# Patient Record
Sex: MALE | Race: WHITE | Employment: FULL TIME | URBAN - METROPOLITAN AREA
[De-identification: names, ages, dates, MRNs, and addresses within clinical notes are randomized per-mention and may not be internally consistent; named-entity substitution may affect disease eponyms.]

---

## 2019-08-27 ENCOUNTER — HOSPITAL ENCOUNTER (EMERGENCY)
Age: 44
Discharge: HOME | End: 2019-08-27
Attending: EMERGENCY MEDICINE
Payer: COMMERCIAL

## 2019-08-27 ENCOUNTER — APPOINTMENT (OUTPATIENT)
Dept: CT IMAGING | Age: 44
End: 2019-08-27
Payer: COMMERCIAL

## 2019-08-27 VITALS
RESPIRATION RATE: 16 BRPM | DIASTOLIC BLOOD PRESSURE: 73 MMHG | OXYGEN SATURATION: 98 % | SYSTOLIC BLOOD PRESSURE: 146 MMHG | HEIGHT: 72 IN | BODY MASS INDEX: 35.21 KG/M2 | TEMPERATURE: 97.8 F | WEIGHT: 260 LBS | HEART RATE: 68 BPM

## 2019-08-27 LAB
BILIRUBIN URINE: NEGATIVE
BLOOD, URINE: NEGATIVE
CLARITY: CLEAR
COLOR: YELLOW
GLUCOSE URINE: NEGATIVE MG/DL
KETONES, URINE: NEGATIVE MG/DL
LEUKOCYTE ESTERASE, URINE: NEGATIVE
NITRITE, URINE: NEGATIVE
PH UA: 7 (ref 5–9)
PROTEIN UA: NEGATIVE MG/DL
SPECIFIC GRAVITY UA: <=1.005 (ref 1–1.03)
UROBILINOGEN, URINE: 0.2 E.U./DL

## 2019-08-27 PROCEDURE — 81003 URINALYSIS AUTO W/O SCOPE: CPT

## 2019-08-27 PROCEDURE — 6360000002 HC RX W HCPCS: Performed by: EMERGENCY MEDICINE

## 2019-08-27 PROCEDURE — 72131 CT LUMBAR SPINE W/O DYE: CPT

## 2019-08-27 PROCEDURE — 96372 THER/PROPH/DIAG INJ SC/IM: CPT

## 2019-08-27 PROCEDURE — 99284 EMERGENCY DEPT VISIT MOD MDM: CPT

## 2019-08-27 RX ORDER — ORPHENADRINE CITRATE 30 MG/ML
60 INJECTION INTRAMUSCULAR; INTRAVENOUS ONCE
Status: COMPLETED | OUTPATIENT
Start: 2019-08-27 | End: 2019-08-27

## 2019-08-27 RX ORDER — CHLORZOXAZONE 500 MG/1
500 TABLET ORAL 3 TIMES DAILY PRN
Qty: 30 TABLET | Refills: 0 | Status: SHIPPED | OUTPATIENT
Start: 2019-08-27 | End: 2019-09-06

## 2019-08-27 RX ORDER — OXYCODONE HYDROCHLORIDE AND ACETAMINOPHEN 5; 325 MG/1; MG/1
1 TABLET ORAL EVERY 6 HOURS PRN
Qty: 10 TABLET | Refills: 0 | Status: SHIPPED | OUTPATIENT
Start: 2019-08-27 | End: 2019-08-30

## 2019-08-27 RX ORDER — KETOROLAC TROMETHAMINE 30 MG/ML
30 INJECTION, SOLUTION INTRAMUSCULAR; INTRAVENOUS ONCE
Status: COMPLETED | OUTPATIENT
Start: 2019-08-27 | End: 2019-08-27

## 2019-08-27 RX ORDER — PREDNISONE 10 MG/1
40 TABLET ORAL DAILY
Qty: 20 TABLET | Refills: 0 | Status: SHIPPED | OUTPATIENT
Start: 2019-08-27 | End: 2019-09-01

## 2019-08-27 RX ADMIN — KETOROLAC TROMETHAMINE 30 MG: 30 INJECTION, SOLUTION INTRAMUSCULAR at 10:44

## 2019-08-27 RX ADMIN — ORPHENADRINE CITRATE 60 MG: 30 INJECTION INTRAMUSCULAR; INTRAVENOUS at 10:44

## 2019-08-27 ASSESSMENT — PAIN DESCRIPTION - PAIN TYPE: TYPE: ACUTE PAIN

## 2019-08-27 ASSESSMENT — PAIN DESCRIPTION - ORIENTATION: ORIENTATION: LOWER

## 2019-08-27 ASSESSMENT — PAIN SCALES - GENERAL
PAINLEVEL_OUTOF10: 5
PAINLEVEL_OUTOF10: 3

## 2019-08-27 ASSESSMENT — PAIN DESCRIPTION - LOCATION: LOCATION: SCROTUM

## 2019-08-27 ASSESSMENT — PAIN DESCRIPTION - DESCRIPTORS: DESCRIPTORS: DISCOMFORT

## 2019-08-27 NOTE — ED PROVIDER NOTES
HPI:  8/27/19,   Time: 11:12 AM         Tyrese Pollack is a 45 y.o. male presenting to the ED for back pain, beginning more than 1 day ago. The complaint has been persistent, moderate in severity, and worsened by nothing. Patient complaining of back pain no history of fall or direct trauma. ROS:   Pertinent positives and negatives are stated within HPI, all other systems reviewed and are negative.  --------------------------------------------- PAST HISTORY ---------------------------------------------  Past Medical History:  has no past medical history on file. Past Surgical History:  has a past surgical history that includes Knee arthroscopy. Social History:  reports that he quit smoking about 5 years ago. His smoking use included cigarettes. He smoked 1.00 pack per day. His smokeless tobacco use includes chew. He reports that he drinks alcohol. He reports that he does not use drugs. Family History: family history is not on file. The patients home medications have been reviewed. Allergies: Patient has no known allergies. -------------------------------------------------- RESULTS -------------------------------------------------  All laboratory and radiology results have been personally reviewed by myself   LABS:  Results for orders placed or performed during the hospital encounter of 08/27/19   Urinalysis   Result Value Ref Range    Color, UA Yellow Straw/Yellow    Clarity, UA Clear Clear    Glucose, Ur Negative Negative mg/dL    Bilirubin Urine Negative Negative    Ketones, Urine Negative Negative mg/dL    Specific Gravity, UA <=1.005 1.005 - 1.030    Blood, Urine Negative Negative    pH, UA 7.0 5.0 - 9.0    Protein, UA Negative Negative mg/dL    Urobilinogen, Urine 0.2 <2.0 E.U./dL    Nitrite, Urine Negative Negative    Leukocyte Esterase, Urine Negative Negative       RADIOLOGY:  Interpreted by Radiologist.  CT Lumbar Spine WO Contrast   Final Result      1.  Degenerative spondylotic

## 2020-12-02 ENCOUNTER — APPOINTMENT (OUTPATIENT)
Dept: GENERAL RADIOLOGY | Age: 45
End: 2020-12-02
Payer: COMMERCIAL

## 2020-12-02 ENCOUNTER — HOSPITAL ENCOUNTER (EMERGENCY)
Age: 45
Discharge: HOME | End: 2020-12-02
Attending: EMERGENCY MEDICINE
Payer: COMMERCIAL

## 2020-12-02 VITALS
TEMPERATURE: 96.9 F | RESPIRATION RATE: 16 BRPM | BODY MASS INDEX: 35.89 KG/M2 | HEIGHT: 72 IN | DIASTOLIC BLOOD PRESSURE: 84 MMHG | OXYGEN SATURATION: 99 % | WEIGHT: 265 LBS | SYSTOLIC BLOOD PRESSURE: 157 MMHG | HEART RATE: 108 BPM

## 2020-12-02 PROCEDURE — 99283 EMERGENCY DEPT VISIT LOW MDM: CPT

## 2020-12-02 PROCEDURE — 73562 X-RAY EXAM OF KNEE 3: CPT

## 2020-12-02 PROCEDURE — 12002 RPR S/N/AX/GEN/TRNK2.6-7.5CM: CPT

## 2020-12-02 PROCEDURE — 73610 X-RAY EXAM OF ANKLE: CPT

## 2020-12-02 PROCEDURE — 73590 X-RAY EXAM OF LOWER LEG: CPT

## 2020-12-02 PROCEDURE — 73630 X-RAY EXAM OF FOOT: CPT

## 2020-12-02 NOTE — ED PROVIDER NOTES
HPI:  12/2/20, Time: 12:27 PM DEUCE Felipe is a 39 y.o. male presenting to the ED for right shin laceration, beginning 1 hour ago. The complaint has been persistent, mild in severity, and worsened by nothing. Patient states that he was at work and he dropped an air compressor on his leg. States that he obtained a laceration to his right anterior leg and also states that his right knee hurts as well. States that he said multiple surgeries on the right knee and that is actually more painful than the laceration on the right shin. Denies any other injury. Denies hitting his head or losing consciousness. Denies fever, fatigue, cough, chest pain, shortness of breath, nausea, vomiting, abdominal pain, diarrhea, constipation, urinary symptoms. Patient does note that his tetanus shot is up-to-date and that he had one last year. Review of Systems:   Please see HPI above. All bolded are positive. All un-bolded are negative.     Constitutional Symptoms: fever, chills, fatigue, generalized weakness, diaphoresis, increase in thirst, loss of appetite  Eyes: vision change   Ears, Nose, Mouth, Throat: hearing loss, nasal congestion, sores in the mouth  Cardiovascular: chest pain, chest heaviness, palpitations  Respiratory: shortness of breath, wheezing, coughing  Gastrointestinal: abdominal pain, nausea, vomiting, diarrhea, constipation, melena, hematochezia, hematemesis  Genitourinary: dysuria, hematuria, increased frequency  Musculoskeletal: lower extremity edema, myalgias, arthralgias, back pain, right knee pain, right ankle pain, right foot pain, right shin pain  Integumentary: Laceration, rashes, itching   Neurological: headache, lightheadedness, dizziness, confusion, syncope, numbness, tingling, focal weakness  Psychiatric: depression, suicidal ideation, anxiety  Endocrine: unintentional weight change  Hematologic/Lymphatic: lymphadenopathy, easy bruising, easy bleeding   Allergic/Immunologic: recurrent infections            --------------------------------------------- PAST HISTORY ---------------------------------------------  Past Medical History:  has no past medical history on file. Past Surgical History:  has a past surgical history that includes Knee arthroscopy. Social History:  reports that he quit smoking about 6 years ago. His smoking use included cigarettes. He smoked 1.00 pack per day. His smokeless tobacco use includes chew. He reports current alcohol use. He reports that he does not use drugs. Family History: family history is not on file. The patients home medications have been reviewed. Allergies: Patient has no known allergies. -------------------------------------------------- RESULTS -------------------------------------------------  All laboratory and radiology results have been personally reviewed by myself   LABS:  No results found for this visit on 12/02/20. RADIOLOGY:  Interpreted by Radiologist.  XR FOOT RIGHT (MIN 3 VIEWS)   Final Result   No evidence for acute fracture. XR ANKLE RIGHT (MIN 3 VIEWS)   Final Result   No evidence for acute fracture. XR KNEE RIGHT (3 VIEWS)   Final Result   No acute fracture or dislocation in right knee and right tibia/fibula      XR TIBIA FIBULA RIGHT (2 VIEWS)   Final Result   No acute fracture or dislocation in right knee and right tibia/fibula          ------------------------- NURSING NOTES AND VITALS REVIEWED ---------------------------   The nursing notes within the ED encounter and vital signs as below have been reviewed.    BP (!) 157/84   Pulse 108   Temp 96.9 °F (36.1 °C) (Tympanic)   Resp 16   Ht 6' (1.829 m)   Wt 265 lb (120.2 kg)   SpO2 99%   BMI 35.94 kg/m²   Oxygen Saturation Interpretation: Normal      ---------------------------------------------------PHYSICAL EXAM--------------------------------------      Constitutional/General: Alert and oriented x3, well appearing, non toxic in NAD  Head: does have a sick centimeter laceration to his right shin. This was repaired using 3-0 Ethilon suture. Patient be discharged home instructions to follow-up with his orthopedic physician, Dr. Felipe Client. He is agreeable to discharge this time. All questions were answered. Counseling: The emergency provider has spoken with the patient and discussed todays results, in addition to providing specific details for the plan of care and counseling regarding the diagnosis and prognosis. Questions are answered at this time and they are agreeable with the plan.      --------------------------------- IMPRESSION AND DISPOSITION ---------------------------------    IMPRESSION  1. Laceration of right lower extremity, initial encounter    2. Contusion of right knee and lower leg, initial encounter        DISPOSITION  Disposition: Discharge to home  Patient condition is stable      NOTE: This report was transcribed using voice recognition software.  Every effort was made to ensure accuracy; however, inadvertent computerized transcription errors may be present       Harley Smtih DO  Resident  12/02/20 4855

## 2021-09-27 ENCOUNTER — TELEPHONE (OUTPATIENT)
Dept: PRIMARY CARE CLINIC | Age: 46
End: 2021-09-27

## 2021-09-27 NOTE — TELEPHONE ENCOUNTER
----- Message from Emelia Goodell sent at 9/27/2021  9:34 AM EDT -----  Subject: Appointment Request    Reason for Call: Routine Pre-Op    QUESTIONS  Type of Appointment? New Patient/New to Provider  Reason for appointment request? No appointments available during search  Additional Information for Provider? Patient is scheduled for surgery on   10/14/21. He is establishing care with Dr Shreya Delcid on 12/15/21. He needs a   pre-op prior to his surgery, R shoulder, Orthopedic surgery center with Dr Kraig Resendiz. Please call patient to advise if he can be seen for this pre-op.   ---------------------------------------------------------------------------  --------------  CALL BACK INFO  What is the best way for the office to contact you? OK to leave message on   voicemail  Preferred Call Back Phone Number? [phone]  ---------------------------------------------------------------------------  --------------  SCRIPT ANSWERS  Relationship to Patient? Self  Specialty Confirmation? Primary Care  Do you have questions for your provider that need to be answered prior to   scheduling your pre-op appointment? No  Have you been diagnosed with, awaiting test results for, or told that you   are suspected of having COVID-19 (Coronavirus)? (If patient has tested   negative or was tested as a requirement for work, school, or travel and   not based on symptoms, answer no)? No  Within the past two weeks have you developed any of the following symptoms   (answer no if symptoms have been present longer than 2 weeks or began   more than 2 weeks ago)? Fever or Chills, Cough, Shortness of breath or   difficulty breathing, Loss of taste or smell, Sore throat, Nasal   congestion, Sneezing or runny nose, Fatigue or generalized body aches   (answer no if pain is specific to a body part e.g. back pain), Diarrhea,   Headache? No  Have you had close contact with someone with COVID-19 in the last 14 days?    No  (Service Expert  click yes below to proceed with [employer] As Usual   Scheduling)?  Yes

## 2021-10-15 ENCOUNTER — HOSPITAL ENCOUNTER (EMERGENCY)
Age: 46
Discharge: HOME | End: 2021-10-15
Payer: COMMERCIAL

## 2021-10-15 VITALS
HEIGHT: 72 IN | DIASTOLIC BLOOD PRESSURE: 75 MMHG | SYSTOLIC BLOOD PRESSURE: 160 MMHG | BODY MASS INDEX: 37.25 KG/M2 | TEMPERATURE: 97.6 F | OXYGEN SATURATION: 97 % | WEIGHT: 275 LBS | RESPIRATION RATE: 16 BRPM | HEART RATE: 59 BPM

## 2021-10-15 DIAGNOSIS — G89.18 POST-OP PAIN: Primary | ICD-10-CM

## 2021-10-15 PROCEDURE — 99283 EMERGENCY DEPT VISIT LOW MDM: CPT

## 2021-10-15 PROCEDURE — 6370000000 HC RX 637 (ALT 250 FOR IP): Performed by: NURSE PRACTITIONER

## 2021-10-15 RX ORDER — OXYCODONE HYDROCHLORIDE 5 MG/1
10 TABLET ORAL ONCE
Status: COMPLETED | OUTPATIENT
Start: 2021-10-15 | End: 2021-10-15

## 2021-10-15 RX ADMIN — OXYCODONE 10 MG: 5 TABLET ORAL at 21:41

## 2021-10-15 ASSESSMENT — PAIN DESCRIPTION - PAIN TYPE: TYPE: ACUTE PAIN

## 2021-10-15 ASSESSMENT — PAIN DESCRIPTION - LOCATION: LOCATION: SHOULDER

## 2021-10-15 ASSESSMENT — PAIN DESCRIPTION - ORIENTATION: ORIENTATION: RIGHT

## 2021-10-15 ASSESSMENT — PAIN DESCRIPTION - FREQUENCY: FREQUENCY: CONTINUOUS

## 2021-10-15 ASSESSMENT — PAIN DESCRIPTION - DESCRIPTORS: DESCRIPTORS: ACHING

## 2021-10-15 ASSESSMENT — PAIN SCALES - GENERAL: PAINLEVEL_OUTOF10: 9

## 2021-10-16 NOTE — ED PROVIDER NOTES
[facility]  Department of Emergency Medicine   ED  Encounter Note  Admit Date/RoomTime: No admission date for patient encounter. ED Room: Room/bed info not found    NAME: Dai Wall  : 1975  MRN: [id number]     Chief Complaint:  Post-op Problem (surgery on right rotator cuff and is in a lot of pain. surgery was yesterday and nerve block wore off this morning with increasing pain, took 6 vicodin with no change )    History of Present Illness       Dai Wall is a 42 y.o. old male who presents to the emergency department by private vehicle, for post-operative Right shoulder pain which occured 1 day(s) prior to arrival.  He states he had rotator cuff repair surgery yesterday by his orthopedic surgeon. He has laparoscopic incisions symptoms right shoulder and he is wearing a sling. He is right handed. The patients tetanus status is up to date. Since onset the symptoms have been persistent. His pain is aggraveated by any movement and relieved by nothing. He denies any head injury, headache, loss of consciousness, confusion, dizziness, neck pain, chest pain, abdominal pain, back pain, extremity injury, numbness, weakness, blurred vision, nausea, vomiting or fever. He states he is taking hydrocodone without improvement of his symptoms. ROS   Pertinent positives and negatives are stated within HPI, all other systems reviewed and are negative. Past Medical History:  has no past medical history on file. Surgical History:  has a past surgical history that includes Knee arthroscopy. Social History:  reports that he quit smoking about 7 years ago. His smoking use included cigarettes. He smoked 1.00 pack per day. His smokeless tobacco use includes chew. He reports current alcohol use. He reports that he does not use drugs. Family History: family history is not on file. Allergies: Patient has no known allergies.     Physical Exam   Oxygen Saturation Interpretation: Normal.        ED Triage Vitals [10/15/21 2029]   BP Temp Temp Source Pulse Resp SpO2 Height Weight   (!) 160/75 97.6 °F (36.4 °C) Oral 59 16 97 % 6' (1.829 m) 275 lb (124.7 kg)         Constitutional:  Alert, development consistent with age. Neck:  Normal ROM. Supple. Non-tender. Right Shoulder: lateral.              Tenderness: mild              Swelling: Mild. Deformity: no deformity observed/palpated. ROM: Limited range of motion due to postop status. Skin: Multiple laparoscopic incision sites with no erythema, edema, bleeding, or drainage. Neurovascular: Motor deficit: none. Sensory deficit: none. Pulse deficit: none. Capillary refill: normal.    Right Elbow: diffusely across elbow            Tenderness:  none. Swelling: None. Deformity: no deformity observed/palpated. ROM: full painless ROM. Skin:  no wounds, erythema, or swelling. Lymphatics: No lymphangitis or edema noted  Neurological:  Oriented. Motor functions intact. Lab / Imaging Results   (All laboratory and radiology results have been personally reviewed by myself)  Labs:  No results found for this visit on 10/15/21. Imaging: All Radiology results interpreted by Radiologist unless otherwise noted. No orders to display     ED Course / Medical Decision Making     Medications   oxyCODONE (ROXICODONE) immediate release tablet 10 mg (has no administration in time range)         MDM:    Patient presented with right lateral shoulder postop pain after having rotator cuff repair yesterday. He states he is taking hydrocodone without improvement. Clinical exam is consistent with expected postop exam.  There are no signs of infection or trauma. He was medicated with 10 mg of oral Roxicodone and he is instructed to continue his home pain regimen as directed.   He is appropriate for discharge and outpatient follow-up. He is instructed to return to the emergency department immediately with any new or worsening symptoms. Plan of Care/Counseling:  LASHON Tirado CNP reviewed today's visit with the patient and spouse / life partner in addition to providing specific details for the plan of care and counseling regarding the diagnosis and prognosis. Questions are answered at this time and are agreeable with the plan. Assessment      1. Post-op pain      Plan   Discharged home. Patient condition is good    New Medications     New Prescriptions    No medications on file     Electronically signed by LASHON Tirado CNP   DD: 10/15/21  **This report was transcribed using voice recognition software. Every effort was made to ensure accuracy; however, inadvertent computerized transcription errors may be present.   END OF ED PROVIDER NOTE          LASHON Roberts CNP  10/15/21 7088

## 2022-09-25 ENCOUNTER — APPOINTMENT (OUTPATIENT)
Dept: GENERAL RADIOLOGY | Age: 47
DRG: 580 | End: 2022-09-25
Payer: COMMERCIAL

## 2022-09-25 ENCOUNTER — HOSPITAL ENCOUNTER (INPATIENT)
Age: 47
LOS: 8 days | Discharge: HOME HEALTH | DRG: 580 | End: 2022-10-03
Attending: EMERGENCY MEDICINE | Admitting: INTERNAL MEDICINE
Payer: COMMERCIAL

## 2022-09-25 DIAGNOSIS — L03.818 CELLULITIS OF OTHER SPECIFIED SITE: Primary | ICD-10-CM

## 2022-09-25 DIAGNOSIS — S69.92XA INJURY OF LEFT HAND, INITIAL ENCOUNTER: ICD-10-CM

## 2022-09-25 DIAGNOSIS — L02.91 ABSCESS: ICD-10-CM

## 2022-09-25 PROBLEM — E66.9 OBESITY (BMI 30-39.9): Status: ACTIVE | Noted: 2022-09-25

## 2022-09-25 PROBLEM — R73.9 HYPERGLYCEMIA: Status: ACTIVE | Noted: 2022-09-25

## 2022-09-25 PROBLEM — L03.90 CELLULITIS: Status: ACTIVE | Noted: 2022-09-25

## 2022-09-25 PROBLEM — E78.5 HYPERLIPIDEMIA: Status: ACTIVE | Noted: 2022-09-25

## 2022-09-25 LAB
ANION GAP SERPL CALCULATED.3IONS-SCNC: 10 MMOL/L (ref 7–16)
BASOPHILS ABSOLUTE: 0.03 E9/L (ref 0–0.2)
BASOPHILS RELATIVE PERCENT: 0.3 % (ref 0–2)
BUN BLDV-MCNC: 16 MG/DL (ref 6–20)
CALCIUM SERPL-MCNC: 9.3 MG/DL (ref 8.6–10.2)
CHLORIDE BLD-SCNC: 104 MMOL/L (ref 98–107)
CO2: 22 MMOL/L (ref 22–29)
CREAT SERPL-MCNC: 0.9 MG/DL (ref 0.7–1.2)
EOSINOPHILS ABSOLUTE: 0.08 E9/L (ref 0.05–0.5)
EOSINOPHILS RELATIVE PERCENT: 0.8 % (ref 0–6)
GFR AFRICAN AMERICAN: >60
GFR NON-AFRICAN AMERICAN: >60 ML/MIN/1.73
GLUCOSE BLD-MCNC: 111 MG/DL (ref 74–99)
HCT VFR BLD CALC: 43.6 % (ref 37–54)
HEMOGLOBIN: 14.9 G/DL (ref 12.5–16.5)
IMMATURE GRANULOCYTES #: 0.04 E9/L
IMMATURE GRANULOCYTES %: 0.4 % (ref 0–5)
LACTIC ACID: 1.2 MMOL/L (ref 0.5–2.2)
LYMPHOCYTES ABSOLUTE: 1.37 E9/L (ref 1.5–4)
LYMPHOCYTES RELATIVE PERCENT: 13.2 % (ref 20–42)
MCH RBC QN AUTO: 31 PG (ref 26–35)
MCHC RBC AUTO-ENTMCNC: 34.2 % (ref 32–34.5)
MCV RBC AUTO: 90.6 FL (ref 80–99.9)
MONOCYTES ABSOLUTE: 0.54 E9/L (ref 0.1–0.95)
MONOCYTES RELATIVE PERCENT: 5.2 % (ref 2–12)
NEUTROPHILS ABSOLUTE: 8.29 E9/L (ref 1.8–7.3)
NEUTROPHILS RELATIVE PERCENT: 80.1 % (ref 43–80)
PDW BLD-RTO: 13.2 FL (ref 11.5–15)
PLATELET # BLD: 186 E9/L (ref 130–450)
PMV BLD AUTO: 10.9 FL (ref 7–12)
POTASSIUM SERPL-SCNC: 4 MMOL/L (ref 3.5–5)
RBC # BLD: 4.81 E12/L (ref 3.8–5.8)
SODIUM BLD-SCNC: 136 MMOL/L (ref 132–146)
WBC # BLD: 10.4 E9/L (ref 4.5–11.5)

## 2022-09-25 PROCEDURE — 83605 ASSAY OF LACTIC ACID: CPT

## 2022-09-25 PROCEDURE — 1200000000 HC SEMI PRIVATE

## 2022-09-25 PROCEDURE — 80048 BASIC METABOLIC PNL TOTAL CA: CPT

## 2022-09-25 PROCEDURE — 2580000003 HC RX 258: Performed by: STUDENT IN AN ORGANIZED HEALTH CARE EDUCATION/TRAINING PROGRAM

## 2022-09-25 PROCEDURE — 99285 EMERGENCY DEPT VISIT HI MDM: CPT

## 2022-09-25 PROCEDURE — 6360000002 HC RX W HCPCS

## 2022-09-25 PROCEDURE — 73130 X-RAY EXAM OF HAND: CPT

## 2022-09-25 PROCEDURE — 6360000002 HC RX W HCPCS: Performed by: STUDENT IN AN ORGANIZED HEALTH CARE EDUCATION/TRAINING PROGRAM

## 2022-09-25 PROCEDURE — 36415 COLL VENOUS BLD VENIPUNCTURE: CPT

## 2022-09-25 PROCEDURE — 85025 COMPLETE CBC W/AUTO DIFF WBC: CPT

## 2022-09-25 PROCEDURE — 2580000003 HC RX 258

## 2022-09-25 PROCEDURE — APPSS45 APP SPLIT SHARED TIME 31-45 MINUTES

## 2022-09-25 PROCEDURE — 87040 BLOOD CULTURE FOR BACTERIA: CPT

## 2022-09-25 RX ORDER — ONDANSETRON 2 MG/ML
4 INJECTION INTRAMUSCULAR; INTRAVENOUS EVERY 6 HOURS PRN
Status: DISCONTINUED | OUTPATIENT
Start: 2022-09-25 | End: 2022-10-03 | Stop reason: HOSPADM

## 2022-09-25 RX ORDER — SODIUM CHLORIDE 0.9 % (FLUSH) 0.9 %
5-40 SYRINGE (ML) INJECTION EVERY 12 HOURS SCHEDULED
Status: DISCONTINUED | OUTPATIENT
Start: 2022-09-25 | End: 2022-09-28 | Stop reason: SDUPTHER

## 2022-09-25 RX ORDER — FLUOXETINE HYDROCHLORIDE 20 MG/1
20 CAPSULE ORAL DAILY
COMMUNITY

## 2022-09-25 RX ORDER — 0.9 % SODIUM CHLORIDE 0.9 %
1000 INTRAVENOUS SOLUTION INTRAVENOUS ONCE
Status: COMPLETED | OUTPATIENT
Start: 2022-09-25 | End: 2022-09-25

## 2022-09-25 RX ORDER — ACETAMINOPHEN 325 MG/1
650 TABLET ORAL EVERY 6 HOURS PRN
Status: DISCONTINUED | OUTPATIENT
Start: 2022-09-25 | End: 2022-10-03 | Stop reason: HOSPADM

## 2022-09-25 RX ORDER — SODIUM CHLORIDE 9 MG/ML
INJECTION, SOLUTION INTRAVENOUS PRN
Status: DISCONTINUED | OUTPATIENT
Start: 2022-09-25 | End: 2022-09-28 | Stop reason: SDUPTHER

## 2022-09-25 RX ORDER — FLUOXETINE HYDROCHLORIDE 20 MG/1
20 CAPSULE ORAL DAILY
Status: DISCONTINUED | OUTPATIENT
Start: 2022-09-26 | End: 2022-10-03 | Stop reason: HOSPADM

## 2022-09-25 RX ORDER — SODIUM CHLORIDE 0.9 % (FLUSH) 0.9 %
5-40 SYRINGE (ML) INJECTION PRN
Status: DISCONTINUED | OUTPATIENT
Start: 2022-09-25 | End: 2022-09-28 | Stop reason: SDUPTHER

## 2022-09-25 RX ORDER — POLYETHYLENE GLYCOL 3350 17 G/17G
17 POWDER, FOR SOLUTION ORAL DAILY PRN
Status: DISCONTINUED | OUTPATIENT
Start: 2022-09-25 | End: 2022-10-03 | Stop reason: HOSPADM

## 2022-09-25 RX ORDER — ATORVASTATIN CALCIUM 20 MG/1
20 TABLET, FILM COATED ORAL NIGHTLY
Status: DISCONTINUED | OUTPATIENT
Start: 2022-09-25 | End: 2022-09-26 | Stop reason: SDUPTHER

## 2022-09-25 RX ORDER — ACETAMINOPHEN 650 MG/1
650 SUPPOSITORY RECTAL EVERY 6 HOURS PRN
Status: DISCONTINUED | OUTPATIENT
Start: 2022-09-25 | End: 2022-10-03 | Stop reason: HOSPADM

## 2022-09-25 RX ORDER — ATORVASTATIN CALCIUM 20 MG/1
20 TABLET, FILM COATED ORAL DAILY
COMMUNITY

## 2022-09-25 RX ORDER — KETOROLAC TROMETHAMINE 30 MG/ML
30 INJECTION, SOLUTION INTRAMUSCULAR; INTRAVENOUS EVERY 6 HOURS PRN
Status: DISPENSED | OUTPATIENT
Start: 2022-09-25 | End: 2022-09-30

## 2022-09-25 RX ORDER — MELOXICAM 15 MG/1
15 TABLET ORAL DAILY
Status: ON HOLD | COMMUNITY
End: 2022-10-03 | Stop reason: HOSPADM

## 2022-09-25 RX ORDER — ONDANSETRON 4 MG/1
4 TABLET, ORALLY DISINTEGRATING ORAL EVERY 8 HOURS PRN
Status: DISCONTINUED | OUTPATIENT
Start: 2022-09-25 | End: 2022-10-03 | Stop reason: HOSPADM

## 2022-09-25 RX ADMIN — KETOROLAC TROMETHAMINE 30 MG: 30 INJECTION, SOLUTION INTRAMUSCULAR; INTRAVENOUS at 23:32

## 2022-09-25 RX ADMIN — SODIUM CHLORIDE 1000 ML: 9 INJECTION, SOLUTION INTRAVENOUS at 20:50

## 2022-09-25 RX ADMIN — Medication 10 ML: at 23:24

## 2022-09-25 RX ADMIN — WATER 2000 MG: 1 INJECTION INTRAMUSCULAR; INTRAVENOUS; SUBCUTANEOUS at 22:17

## 2022-09-25 ASSESSMENT — PAIN DESCRIPTION - LOCATION
LOCATION: HAND
LOCATION: HAND

## 2022-09-25 ASSESSMENT — ENCOUNTER SYMPTOMS
COUGH: 0
NAUSEA: 0
VOICE CHANGE: 0
DIARRHEA: 0
RHINORRHEA: 0
ABDOMINAL PAIN: 0
PHOTOPHOBIA: 0
TROUBLE SWALLOWING: 0
VOMITING: 0
SHORTNESS OF BREATH: 0

## 2022-09-25 ASSESSMENT — PAIN DESCRIPTION - DESCRIPTORS: DESCRIPTORS: ACHING;DISCOMFORT

## 2022-09-25 ASSESSMENT — PAIN DESCRIPTION - FREQUENCY: FREQUENCY: CONTINUOUS

## 2022-09-25 ASSESSMENT — PAIN SCALES - GENERAL
PAINLEVEL_OUTOF10: 4
PAINLEVEL_OUTOF10: 4

## 2022-09-25 ASSESSMENT — PAIN - FUNCTIONAL ASSESSMENT: PAIN_FUNCTIONAL_ASSESSMENT: 0-10

## 2022-09-25 ASSESSMENT — PAIN DESCRIPTION - ORIENTATION
ORIENTATION: LEFT
ORIENTATION: LEFT

## 2022-09-25 ASSESSMENT — PAIN DESCRIPTION - PAIN TYPE: TYPE: ACUTE PAIN

## 2022-09-26 LAB
ANION GAP SERPL CALCULATED.3IONS-SCNC: 11 MMOL/L (ref 7–16)
BASOPHILS ABSOLUTE: 0.04 E9/L (ref 0–0.2)
BASOPHILS RELATIVE PERCENT: 0.5 % (ref 0–2)
BUN BLDV-MCNC: 16 MG/DL (ref 6–20)
CALCIUM SERPL-MCNC: 9 MG/DL (ref 8.6–10.2)
CHLORIDE BLD-SCNC: 106 MMOL/L (ref 98–107)
CO2: 22 MMOL/L (ref 22–29)
CREAT SERPL-MCNC: 0.9 MG/DL (ref 0.7–1.2)
EOSINOPHILS ABSOLUTE: 0.16 E9/L (ref 0.05–0.5)
EOSINOPHILS RELATIVE PERCENT: 1.8 % (ref 0–6)
GFR AFRICAN AMERICAN: >60
GFR NON-AFRICAN AMERICAN: >60 ML/MIN/1.73
GLUCOSE BLD-MCNC: 99 MG/DL (ref 74–99)
HBA1C MFR BLD: 5.1 % (ref 4–5.6)
HCT VFR BLD CALC: 41.7 % (ref 37–54)
HEMOGLOBIN: 13.7 G/DL (ref 12.5–16.5)
IMMATURE GRANULOCYTES #: 0.03 E9/L
IMMATURE GRANULOCYTES %: 0.3 % (ref 0–5)
LYMPHOCYTES ABSOLUTE: 1.7 E9/L (ref 1.5–4)
LYMPHOCYTES RELATIVE PERCENT: 19.1 % (ref 20–42)
MCH RBC QN AUTO: 29.9 PG (ref 26–35)
MCHC RBC AUTO-ENTMCNC: 32.9 % (ref 32–34.5)
MCV RBC AUTO: 91 FL (ref 80–99.9)
MONOCYTES ABSOLUTE: 0.65 E9/L (ref 0.1–0.95)
MONOCYTES RELATIVE PERCENT: 7.3 % (ref 2–12)
NEUTROPHILS ABSOLUTE: 6.3 E9/L (ref 1.8–7.3)
NEUTROPHILS RELATIVE PERCENT: 71 % (ref 43–80)
PDW BLD-RTO: 13.2 FL (ref 11.5–15)
PLATELET # BLD: 155 E9/L (ref 130–450)
PMV BLD AUTO: 11.6 FL (ref 7–12)
POTASSIUM REFLEX MAGNESIUM: 3.8 MMOL/L (ref 3.5–5)
RBC # BLD: 4.58 E12/L (ref 3.8–5.8)
SODIUM BLD-SCNC: 139 MMOL/L (ref 132–146)
WBC # BLD: 8.9 E9/L (ref 4.5–11.5)

## 2022-09-26 PROCEDURE — 2580000003 HC RX 258

## 2022-09-26 PROCEDURE — 1200000000 HC SEMI PRIVATE

## 2022-09-26 PROCEDURE — 6360000002 HC RX W HCPCS

## 2022-09-26 PROCEDURE — 36415 COLL VENOUS BLD VENIPUNCTURE: CPT

## 2022-09-26 PROCEDURE — 85025 COMPLETE CBC W/AUTO DIFF WBC: CPT

## 2022-09-26 PROCEDURE — 83036 HEMOGLOBIN GLYCOSYLATED A1C: CPT

## 2022-09-26 PROCEDURE — 6370000000 HC RX 637 (ALT 250 FOR IP)

## 2022-09-26 PROCEDURE — 99232 SBSQ HOSP IP/OBS MODERATE 35: CPT | Performed by: INTERNAL MEDICINE

## 2022-09-26 PROCEDURE — 80048 BASIC METABOLIC PNL TOTAL CA: CPT

## 2022-09-26 PROCEDURE — 87070 CULTURE OTHR SPECIMN AEROBIC: CPT

## 2022-09-26 RX ORDER — ATORVASTATIN CALCIUM 20 MG/1
20 TABLET, FILM COATED ORAL DAILY
Status: DISCONTINUED | OUTPATIENT
Start: 2022-09-26 | End: 2022-10-03 | Stop reason: HOSPADM

## 2022-09-26 RX ADMIN — Medication 10 ML: at 20:48

## 2022-09-26 RX ADMIN — KETOROLAC TROMETHAMINE 30 MG: 30 INJECTION, SOLUTION INTRAMUSCULAR; INTRAVENOUS at 05:26

## 2022-09-26 RX ADMIN — ACETAMINOPHEN 650 MG: 325 TABLET ORAL at 18:09

## 2022-09-26 RX ADMIN — KETOROLAC TROMETHAMINE 30 MG: 30 INJECTION, SOLUTION INTRAMUSCULAR; INTRAVENOUS at 14:02

## 2022-09-26 RX ADMIN — WATER 1000 MG: 1 INJECTION INTRAMUSCULAR; INTRAVENOUS; SUBCUTANEOUS at 05:26

## 2022-09-26 RX ADMIN — WATER 1000 MG: 1 INJECTION INTRAMUSCULAR; INTRAVENOUS; SUBCUTANEOUS at 14:03

## 2022-09-26 RX ADMIN — WATER 1000 MG: 1 INJECTION INTRAMUSCULAR; INTRAVENOUS; SUBCUTANEOUS at 22:30

## 2022-09-26 RX ADMIN — KETOROLAC TROMETHAMINE 30 MG: 30 INJECTION, SOLUTION INTRAMUSCULAR; INTRAVENOUS at 20:48

## 2022-09-26 RX ADMIN — Medication 10 ML: at 09:20

## 2022-09-26 ASSESSMENT — PAIN - FUNCTIONAL ASSESSMENT: PAIN_FUNCTIONAL_ASSESSMENT: ACTIVITIES ARE NOT PREVENTED

## 2022-09-26 ASSESSMENT — PAIN DESCRIPTION - PAIN TYPE: TYPE: ACUTE PAIN

## 2022-09-26 ASSESSMENT — PAIN SCALES - GENERAL
PAINLEVEL_OUTOF10: 4
PAINLEVEL_OUTOF10: 3
PAINLEVEL_OUTOF10: 2
PAINLEVEL_OUTOF10: 2

## 2022-09-26 ASSESSMENT — PAIN DESCRIPTION - DESCRIPTORS
DESCRIPTORS: ACHING
DESCRIPTORS: DISCOMFORT
DESCRIPTORS: THROBBING

## 2022-09-26 ASSESSMENT — PAIN DESCRIPTION - LOCATION
LOCATION: HEAD
LOCATION: HAND
LOCATION: HAND

## 2022-09-26 ASSESSMENT — PAIN DESCRIPTION - ORIENTATION
ORIENTATION: LEFT
ORIENTATION: LEFT

## 2022-09-26 ASSESSMENT — PAIN DESCRIPTION - FREQUENCY: FREQUENCY: CONTINUOUS

## 2022-09-26 NOTE — PROGRESS NOTES
Dr Al Avila called back deferring to on call ortho [Initial Visit] : an initial visit for [Hip Pain] : hip pain

## 2022-09-26 NOTE — PLAN OF CARE
Problem: Pain  Goal: Verbalizes/displays adequate comfort level or baseline comfort level  9/26/2022 1340 by Baldomero Montoya RN  Outcome: Progressing  9/26/2022 0014 by Lala Jaime RN  Outcome: Progressing

## 2022-09-26 NOTE — PROGRESS NOTES
Called to Dr. Tova Pendleton answering serve regarding the new consult. Message sent to Dr. Augustus Hamilton who is on call tonight.          Electronically signed by Edy Wells RN on 9/25/2022 at 11:51 PM

## 2022-09-26 NOTE — H&P
[facility] History and Physical      CHIEF COMPLAINT:  left hand swelling    History of Present Illness: This is a 41year old male with a past medical history of hyperlipidemia who presents to the ED with complaints of left hand pain and swelling. States he cut his hand on a muffler on Friday and was seen in Urgent Care. He had several stitches placed and was prescribed Bactrim. He forgot to fill the Bactrim and took only one dose this AM. He stated two stitches came out yesterday and two more today. His left hand also became swollen today. He is having difficulty flexing his hand due to the swelling. He presented to Urgent Care and had Steri-strips placed over the wound and was recommended to the ED. He states he does have throbbing pain in his left hand, which is mild at rest, but can be severe at times. He states he has been taking Tylenol for the pain, but it has not helped. He has seen [address]  for past injuries. He denies any other symptoms, including fever, chills, shortness of breath, or chest pain. Patient received a one liter bolus of normal saline and 2 grams of Ancef in the ED. Informant(s) for H&P: patient and chart review    REVIEW OF SYSTEMS:  A comprehensive review of systems was negative except for: what is in the HPI      PMH:  Past Medical History:   Diagnosis Date    Hyperlipidemia        Surgical History:  Past Surgical History:   Procedure Laterality Date    KNEE ARTHROSCOPY         Medications Prior to Admission:    Prior to Admission medications    Not on File       Allergies:    Patient has no known allergies. Social History:    reports that he quit smoking about 8 years ago. His smoking use included cigarettes. He smoked an average of 1 pack per day. His smokeless tobacco use includes chew. He reports current alcohol use. He reports that he does not use drugs. Family History:   family history is not on file.        PHYSICAL EXAM:  Vitals:  BP (!) 140/90   Pulse 93   Temp 98.8 °F (37.1 °C)   Resp 14   Ht 6' (1.829 m)   Wt 275 lb (124.7 kg)   SpO2 97%   BMI 37.30 kg/m²     General Appearance: alert and oriented to person, place and time and in no acute distress  Skin: warm and dry  Head: normocephalic and atraumatic  Eyes: pupils equal, round, and reactive to light, conjunctivae normal  Pulmonary/Chest: clear to auscultation bilaterally- no wheezes, rales or rhonchi, normal air movement, no respiratory distress  Cardiovascular: normal rate, normal S1 and S2  Abdomen: soft, non-tender, non-distended, normal bowel sounds, no masses or organomegaly  Extremities: no cyanosis, no clubbing, edema and erythema to left hand, wound with intact Steri strips and one stitch, no drainage noted  Neurologic: no cranial nerve deficit and speech normal        LABS:  Recent Labs     09/25/22  1627      K 4.0      CO2 22   BUN 16   CREATININE 0.9   GLUCOSE 111*   CALCIUM 9.3       Recent Labs     09/25/22  1627   WBC 10.4   RBC 4.81   HGB 14.9   HCT 43.6   MCV 90.6   MCH 31.0   MCHC 34.2   RDW 13.2      MPV 10.9       No results for input(s): POCGLU in the last 72 hours. Radiology:   XR HAND LEFT (MIN 3 VIEWS)   Final Result   Marked soft tissue swelling without evidence of acute fracture or radiopaque   foreign body. EKG: not available    ASSESSMENT:      Principal Problem:    Cellulitis  Active Problems:    Hyperlipidemia    Obesity (BMI 30-39. 9)  Resolved Problems:    * No resolved hospital problems. *      PLAN:    1. Cellulitis: Left hand swollen with erythema surrounding wound. No drainage noted. WBC WNL. Lactic acid 1.2 Received one dose Ancef in ED. Ancef ordered every  8 hours. Orthopedics consulted. 2. Hyperlipidemia: Continue Lipitor  3. Obesity: Encourage diet and lifestyle changes. Supportive care. 4. Hyperglycemia: Blood sugar 111 on admit. No history of diabetes. Check hemoglobin A1C.       Code Status: full  DVT prophylaxis: SCDs    35 minutes or more spent reviewing patient chart, assessing patient, discussing plan of care with patient and family, discussing plan of care with collaborating physician, and documentation. NOTE: This report was transcribed using voice recognition software. Every effort was made to ensure accuracy; however, inadvertent computerized transcription errors may be present. Electronically signed by LASHON Hunt CNP on 9/25/2022 at 10:54 PM     I saw and evaluated the patient, performing the key elements of the service. I discussed the findings, assessment and plan with the NP and agree with the resident's findings and plan as documented in the  note. Left hand cellulitis. No evidence of sepsis.   Agree with antibiotics

## 2022-09-26 NOTE — CARE COORDINATION
Introduced my self and provided explanation of CM role to patient. Patient is awake, alert, and aware of current diagnosis and treatment plan including iv atb therapy with planned transition to po therapy. He voices he resides at home with his spouse and completes his adl's with independence. Patient is established with a pcp and denies any issue with retail pharmaceutical coverage. Patient verbalizes no concerns and identifies no areas to focus on nor barriers to discharge. Explained ELOS of 24-48 hours; patient voiced understanding and agreement. Will follow along with  and assist with discharge planning as necessary. Yovany Obrien, MSN RN  [facility] Case Management  [phone]

## 2022-09-26 NOTE — PROGRESS NOTES
Dr Tigist Lopez responded back and stated he was not on call, Dr Catrachito Lu notified and ortho consult cancelled

## 2022-09-26 NOTE — PROGRESS NOTES
[facility] called back and said that consult was placed yesterday and needs to be called out to Dr Shan Bailey  who was on call yesterday

## 2022-09-26 NOTE — ED PROVIDER NOTES
HPI     Patient is a 29-year-old male with no reported medical history presents to the emergency department due to left hand pain and swelling. Patient he states that he injured his hand on Friday working on his car when a muffler hit his hand and cut it. Patient went to urgent care and had stitches placed. He was sent home on oral antibiotics, Bactrim but has not taken a dose until today. Yesterday night, patient states that he noticed 2 stitches fell out. This morning he was noticing worsening swelling in the hand making it difficult for him to use his hand due to the pain and swelling. Also noticed loose stitches and came back to urgent care for evaluation. Patient had Steri-Strips placed over the laceration where the stitches fell out but was advised to come to the emergency department for further work-up and evaluation. Other than left hand pain and swelling, patient is denying any other symptoms. He has no fever, chills, nausea, vomiting, chest pain, shortness of breath, headache, lightheadedness, syncope, cough, congestion, sore throat, myalgias, urinary symptoms, constipation or diarrhea. Patient is right-handed. Left hand function limited secondary to swelling and pain. Patient has moderate swelling of the left hand with cellulitic changes of the overlying skin. On initial evaluation, he is nontoxic-appearing and in no acute distress. Tetanus was up to date prior to his injury. Patient has seen Dr. Petr eJffrey in the past for orthopedics but has not seen him for evaluation of the hand. Review of Systems   Constitutional:  Negative for chills and fever. HENT:  Negative for congestion, rhinorrhea, trouble swallowing and voice change. Eyes:  Negative for photophobia and visual disturbance. Respiratory:  Negative for cough and shortness of breath. Cardiovascular:  Negative for chest pain and palpitations. Gastrointestinal:  Negative for abdominal pain, diarrhea, nausea and vomiting. Genitourinary:  Negative for dysuria, frequency, penile pain and urgency. Musculoskeletal:  Negative for arthralgias. Left hand pain. Skin:  Positive for wound. Negative for rash. Left hand swelling. Neurological:  Negative for dizziness, weakness, numbness and headaches. Psychiatric/Behavioral:  Negative for behavioral problems and confusion. Physical Exam  Constitutional:       General: He is not in acute distress. Appearance: Normal appearance. He is not ill-appearing. HENT:      Head: Normocephalic and atraumatic. Eyes:      Pupils: Pupils are equal, round, and reactive to light. Cardiovascular:      Rate and Rhythm: Normal rate and regular rhythm. Pulses: Normal pulses. Heart sounds: Normal heart sounds. Pulmonary:      Effort: Pulmonary effort is normal. No respiratory distress. Breath sounds: Normal breath sounds. No wheezing or rales. Abdominal:      Tenderness: There is no abdominal tenderness. There is no guarding or rebound. Musculoskeletal:         General: Swelling present. Cervical back: Normal range of motion and neck supple. Comments: Left hand swelling. Patient able to do okay sign, thumbs up and abduct/adduct against resistance. Difficult to fully flex fingers due to swelling. Bilateral upper extremity neurovascular intact. Good radial pulse palpated. No sensory deficits noted. Skin:     General: Skin is warm and dry. Findings: Erythema present. Comments: Cellulitic changes over the overlying skin of the left hand. Erythema and warmth with no lymphatic streaking, crepitance or fluctuance noted. Neurological:      General: No focal deficit present. Mental Status: He is alert and oriented to person, place, and time. Cranial Nerves: No cranial nerve deficit.       Coordination: Coordination normal.            MDM   Patient is a 61-year-old male with no reported medical history presents to the emergency department due to left hand pain and swelling. On initial evaluation, patient is nontoxic-appearing, afebrile, hemodynamically stable in no acute distress. Patient mildly tachycardic at 111 on initial vitals but remaining vitals within normal limits. Lab work-up in the emergency department generally unremarkable. Physical exam notable for left hand swelling and cellulitic changes over the left hand where he had recent left hand injury/laceration. Patient was on oral antibiotics, Bactrim but only took 1 dose today. Vital stable but patient likely would benefit from IV antibiotics for cellulitis. Spoke with Dr. Omar Alexandre who accepted patient for admission. Work-up results discussed with patient and he was agreeable with admission as well. Patient remained stable. He was given IV fluids and Ancef x1 dose in the emergency department. ED Course as of 09/25/22 2217   Lauro Gilbert Sep 25, 2022   2211 Patient accepted for admission by Dr. Omar Alexandre. [PP]      ED Course User Index  [PP] Irven Alert, DO          --------------------------------------------- PAST HISTORY ---------------------------------------------  Past Medical History:  has no past medical history on file. Past Surgical History:  has a past surgical history that includes Knee arthroscopy. Social History:  reports that he quit smoking about 8 years ago. His smoking use included cigarettes. He smoked an average of 1 pack per day. His smokeless tobacco use includes chew. He reports current alcohol use. He reports that he does not use drugs. Family History: family history is not on file. The patients home medications have been reviewed. Allergies: Patient has no known allergies.     -------------------------------------------------- RESULTS -------------------------------------------------    LABS:  Results for orders placed or performed during the hospital encounter of 09/25/22   CBC with Auto Differential   Result Value Ref Range    WBC 10.4 4.5 - 11.5 E9/L    RBC 4.81 3.80 - 5.80 E12/L    Hemoglobin 14.9 12.5 - 16.5 g/dL    Hematocrit 43.6 37.0 - 54.0 %    MCV 90.6 80.0 - 99.9 fL    MCH 31.0 26.0 - 35.0 pg    MCHC 34.2 32.0 - 34.5 %    RDW 13.2 11.5 - 15.0 fL    Platelets 553 097 - 736 E9/L    MPV 10.9 7.0 - 12.0 fL    Neutrophils % 80.1 (H) 43.0 - 80.0 %    Immature Granulocytes % 0.4 0.0 - 5.0 %    Lymphocytes % 13.2 (L) 20.0 - 42.0 %    Monocytes % 5.2 2.0 - 12.0 %    Eosinophils % 0.8 0.0 - 6.0 %    Basophils % 0.3 0.0 - 2.0 %    Neutrophils Absolute 8.29 (H) 1.80 - 7.30 E9/L    Immature Granulocytes # 0.04 E9/L    Lymphocytes Absolute 1.37 (L) 1.50 - 4.00 E9/L    Monocytes Absolute 0.54 0.10 - 0.95 E9/L    Eosinophils Absolute 0.08 0.05 - 0.50 E9/L    Basophils Absolute 0.03 0.00 - 0.20 Q8/C   Basic Metabolic Panel   Result Value Ref Range    Sodium 136 132 - 146 mmol/L    Potassium 4.0 3.5 - 5.0 mmol/L    Chloride 104 98 - 107 mmol/L    CO2 22 22 - 29 mmol/L    Anion Gap 10 7 - 16 mmol/L    Glucose 111 (H) 74 - 99 mg/dL    BUN 16 6 - 20 mg/dL    Creatinine 0.9 0.7 - 1.2 mg/dL    GFR Non-African American >60 >=60 mL/min/1.73    GFR African American >60     Calcium 9.3 8.6 - 10.2 mg/dL   Lactic Acid   Result Value Ref Range    Lactic Acid 1.2 0.5 - 2.2 mmol/L       RADIOLOGY:  XR HAND LEFT (MIN 3 VIEWS)   Final Result   Marked soft tissue swelling without evidence of acute fracture or radiopaque   foreign body. ------------------------- NURSING NOTES AND VITALS REVIEWED ---------------------------  Date / Time Roomed:  9/25/2022  7:14 PM  ED Bed Assignment:  14/14    The nursing notes within the ED encounter and vital signs as below have been reviewed.      Patient Vitals for the past 24 hrs:   BP Temp Temp src Pulse Resp SpO2 Height Weight   09/25/22 2219 (!) 140/90 98.8 °F (37.1 °C) -- 93 14 97 % -- --   09/25/22 1533 (!) 151/93 99.1 °F (37.3 °C) Oral (!) 111 16 96 % 6' (1.829 m) 275 lb (124.7 kg)       Oxygen Saturation Interpretation: Normal    ------------------------------------------ PROGRESS NOTES ------------------------------------------    Counseling:  I have spoken with the patient and discussed todays results, in addition to providing specific details for the plan of care and counseling regarding the diagnosis and prognosis. Their questions are answered at this time and they are agreeable with the plan of admission.    --------------------------------- ADDITIONAL PROVIDER NOTES ---------------------------------  Consultations:  Time: 2211. Spoke with Dr. Karla Bernardo. Discussed case. They will admit the patient. This patient's ED course included: a personal history and physicial examination, re-evaluation prior to disposition, multiple bedside re-evaluations, and IV medications    This patient has remained hemodynamically stable during their ED course. Diagnosis:  1. Cellulitis of other specified site    2. Injury of left hand, initial encounter        Disposition:  Patient's disposition: Admit to med/surg floor  Patient's condition is stable.            Shakila Sharma DO  Resident  09/25/22 7949

## 2022-09-26 NOTE — PROGRESS NOTES
[facility] Hospitalist   Progress Note    Admitting Date and Time: 9/25/2022  7:14 PM  Admit Dx: Cellulitis [L03.90]  Injury of left hand, initial encounter [S69.92XA]  Cellulitis of other specified site [L03.818]    Subjective: Patient came to ED on 25th with left hand pain and swelling. Injured while working on car and did have stitches taken  at urgent care. Patient with mild tachycardia on presentation but nontoxic. Admitted with cellulitis. Patient with hyperlipidemia and no fracture or foreign body noted on x-rays in ED. Patient was admitted with Cellulitis [L03.90]  Injury of left hand, initial encounter [S69.92XA]  Cellulitis of other specified site [L03.818]. Patient feels comfortable, at this time awake, alert, sitting in bed, does communicate well. Does say that he forgot to fill up his prescription, he was concerned as the initial 2 stitches came out, that is why he went to urgent care, where 2 more stitches come out, had only 1 stitch left, Steri-Strips were applied by the urgent care. At this time patient has Steri-Strips and wound is healing well. Patient also had a concern because of the local swelling. No cultures were taken at urgent care. Does say some cultures were taken at ED however he already had started his antibiotics before that. Per RN: Do we really need orthopedics consult as she has been talking to different groups due to consult not being sent to proper group.     ROS: denies fever, chills, cp, sob, n/v, HA unless stated above.     sodium chloride flush  5-40 mL IntraVENous 2 times per day    ceFAZolin  1,000 mg IntraVENous Q8H    atorvastatin  20 mg Oral Nightly    FLUoxetine  20 mg Oral Daily     sodium chloride flush, 5-40 mL, PRN  sodium chloride, , PRN  ondansetron, 4 mg, Q8H PRN   Or  ondansetron, 4 mg, Q6H PRN  polyethylene glycol, 17 g, Daily PRN  acetaminophen, 650 mg, Q6H PRN   Or  acetaminophen, 650 mg, Q6H PRN  ketorolac, 30 mg, Q6H PRN Objective:    BP (!) 144/86   Pulse 83   Temp 98 °F (36.7 °C) (Oral)   Resp 17   Ht 6' (1.829 m)   Wt 278 lb (126.1 kg)   SpO2 94%   BMI 37.70 kg/m²   General Appearance: alert and oriented to person, place and time, well-developed and well-nourished, in no acute distress  Skin: warm and dry, no rash or erythema  Head: normocephalic and atraumatic  Eyes: pupils equal, round, and reactive to light, extraocular eye movements intact, conjunctivae normal  ENT: tympanic membrane, external ear and ear canal normal bilaterally, oropharynx clear and moist with normal mucous membranes  Neck: neck supple and non tender without mass, no thyromegaly or thyroid nodules, no cervical lymphadenopathy   Pulmonary/Chest: clear to auscultation bilaterally- no wheezes, rales or rhonchi, normal air movement, no respiratory distress  Cardiovascular: normal rate, normal S1 and S2, no gallops, intact distal pulses, and no carotid bruits  Abdomen: soft, non-tender, non-distended, normal bowel sounds, no masses or organomegaly  Local examination, wound healing well, no discharge, minimal swelling, minimal erythema, minimal tenderness. Recent Labs     09/25/22  1627 09/26/22  0300    139   K 4.0 3.8    106   CO2 22 22   BUN 16 16   CREATININE 0.9 0.9   GLUCOSE 111* 99   CALCIUM 9.3 9.0       Recent Labs     09/25/22  1627 09/26/22  0300   WBC 10.4 8.9   RBC 4.81 4.58   HGB 14.9 13.7   HCT 43.6 41.7   MCV 90.6 91.0   MCH 31.0 29.9   MCHC 34.2 32.9   RDW 13.2 13.2    155   MPV 10.9 11.6     A1c 5.1  Blood cultures, pending. Wound cultures, pending. Radiology:   XR HAND LEFT (MIN 3 VIEWS)   Final Result   Marked soft tissue swelling without evidence of acute fracture or radiopaque   foreign body. Assessment:    Principal Problem:    Cellulitis  Active Problems:    Hyperlipidemia    Obesity (BMI 30-39. 9)    Hyperglycemia  Resolved Problems:    * No resolved hospital problems.  *      Plan:  Right hand wound, input from subspecialty pending, wound improving, no change for now  Infected wound, unfortunately no available cultures or cultures obtained prior to antibiotics, patient improving on Ancef, likely will continue Augmentin on DC. DC planning, likely in a day or 2.         Electronically signed by Idris Stevens MD on 9/26/2022 at 8:37 AM

## 2022-09-27 PROCEDURE — 6360000002 HC RX W HCPCS: Performed by: STUDENT IN AN ORGANIZED HEALTH CARE EDUCATION/TRAINING PROGRAM

## 2022-09-27 PROCEDURE — 87186 SC STD MICRODIL/AGAR DIL: CPT

## 2022-09-27 PROCEDURE — 6360000002 HC RX W HCPCS

## 2022-09-27 PROCEDURE — 2580000003 HC RX 258: Performed by: STUDENT IN AN ORGANIZED HEALTH CARE EDUCATION/TRAINING PROGRAM

## 2022-09-27 PROCEDURE — 87077 CULTURE AEROBIC IDENTIFY: CPT

## 2022-09-27 PROCEDURE — 87205 SMEAR GRAM STAIN: CPT

## 2022-09-27 PROCEDURE — 1200000000 HC SEMI PRIVATE

## 2022-09-27 PROCEDURE — 2580000003 HC RX 258

## 2022-09-27 PROCEDURE — 6370000000 HC RX 637 (ALT 250 FOR IP)

## 2022-09-27 PROCEDURE — 99233 SBSQ HOSP IP/OBS HIGH 50: CPT | Performed by: INTERNAL MEDICINE

## 2022-09-27 PROCEDURE — 87070 CULTURE OTHR SPECIMN AEROBIC: CPT

## 2022-09-27 RX ADMIN — KETOROLAC TROMETHAMINE 30 MG: 30 INJECTION, SOLUTION INTRAMUSCULAR; INTRAVENOUS at 11:27

## 2022-09-27 RX ADMIN — VANCOMYCIN HYDROCHLORIDE 2500 MG: 10 INJECTION, POWDER, LYOPHILIZED, FOR SOLUTION INTRAVENOUS at 18:36

## 2022-09-27 RX ADMIN — FLUOXETINE HYDROCHLORIDE 20 MG: 20 CAPSULE ORAL at 08:23

## 2022-09-27 RX ADMIN — WATER 1000 MG: 1 INJECTION INTRAMUSCULAR; INTRAVENOUS; SUBCUTANEOUS at 14:14

## 2022-09-27 RX ADMIN — ATORVASTATIN CALCIUM 20 MG: 20 TABLET, FILM COATED ORAL at 08:23

## 2022-09-27 RX ADMIN — Medication 10 ML: at 20:07

## 2022-09-27 RX ADMIN — WATER 1000 MG: 1 INJECTION INTRAMUSCULAR; INTRAVENOUS; SUBCUTANEOUS at 05:22

## 2022-09-27 RX ADMIN — KETOROLAC TROMETHAMINE 30 MG: 30 INJECTION, SOLUTION INTRAMUSCULAR; INTRAVENOUS at 20:07

## 2022-09-27 RX ADMIN — Medication 10 ML: at 11:29

## 2022-09-27 RX ADMIN — KETOROLAC TROMETHAMINE 30 MG: 30 INJECTION, SOLUTION INTRAMUSCULAR; INTRAVENOUS at 05:22

## 2022-09-27 ASSESSMENT — PAIN SCALES - GENERAL
PAINLEVEL_OUTOF10: 1
PAINLEVEL_OUTOF10: 3
PAINLEVEL_OUTOF10: 6

## 2022-09-27 ASSESSMENT — PAIN DESCRIPTION - DESCRIPTORS
DESCRIPTORS: SORE;THROBBING
DESCRIPTORS: ACHING;DISCOMFORT

## 2022-09-27 ASSESSMENT — PAIN DESCRIPTION - LOCATION
LOCATION: HAND

## 2022-09-27 ASSESSMENT — PAIN - FUNCTIONAL ASSESSMENT: PAIN_FUNCTIONAL_ASSESSMENT: PREVENTS OR INTERFERES SOME ACTIVE ACTIVITIES AND ADLS

## 2022-09-27 ASSESSMENT — PAIN DESCRIPTION - ONSET: ONSET: ON-GOING

## 2022-09-27 ASSESSMENT — PAIN DESCRIPTION - ORIENTATION
ORIENTATION: LEFT

## 2022-09-27 ASSESSMENT — PAIN DESCRIPTION - PAIN TYPE: TYPE: ACUTE PAIN

## 2022-09-27 NOTE — PROGRESS NOTES
[facility] Hospitalist   Progress Note    Admitting Date and Time: 9/25/2022  7:14 PM  Admit Dx: Cellulitis [L03.90]  Injury of left hand, initial encounter [S69.92XA]  Cellulitis of other specified site [L03.818]    Subjective: Patient came to ED on 25th with left hand pain and swelling. Injured while working on car and did have stitches taken  at urgent care. Patient with mild tachycardia on presentation but nontoxic. Admitted with cellulitis. Patient with hyperlipidemia and no fracture or foreign body noted on x-rays in ED. unable to get orthopedic input,    Patient was admitted with Cellulitis [L03.90]  Injury of left hand, initial encounter [S69.92XA]  Cellulitis of other specified site [L03.818]. Patient is awake, alert, sitting by window, not in distress. According to patient there is angulation of his middle finger is not new after injury when he was under the car and muffler came on him. Per RN: Patient with concern over somewhat angulation of his middle finger, also worsening swelling. Now has some drainage and the cultures were sent. ROS: denies fever, chills, cp, sob, n/v, HA unless stated above.      atorvastatin  20 mg Oral Daily    sodium chloride flush  5-40 mL IntraVENous 2 times per day    ceFAZolin  1,000 mg IntraVENous Q8H    FLUoxetine  20 mg Oral Daily     sodium chloride flush, 5-40 mL, PRN  sodium chloride, , PRN  ondansetron, 4 mg, Q8H PRN   Or  ondansetron, 4 mg, Q6H PRN  polyethylene glycol, 17 g, Daily PRN  acetaminophen, 650 mg, Q6H PRN   Or  acetaminophen, 650 mg, Q6H PRN  ketorolac, 30 mg, Q6H PRN       Objective:    /72   Pulse 77   Temp 98.7 °F (37.1 °C) (Oral)   Resp 14   Ht 6' (1.829 m)   Wt 275 lb (124.7 kg)   SpO2 96%   BMI 37.30 kg/m²   General Appearance: alert and oriented to person, place and time, well-developed and well-nourished, in no acute distress  Skin: warm and dry, no rash or erythema  Head: normocephalic and atraumatic  Eyes: pupils equal, round, and reactive to light, extraocular eye movements intact, conjunctivae normal  ENT: tympanic membrane, external ear and ear canal normal bilaterally, oropharynx clear and moist with normal mucous membranes  Neck: neck supple and non tender without mass, no thyromegaly or thyroid nodules, no cervical lymphadenopathy   Pulmonary/Chest: clear to auscultation bilaterally- no wheezes, rales or rhonchi, normal air movement, no respiratory distress  Cardiovascular: normal rate, normal S1 and S2, no gallops, intact distal pulses, and no carotid bruits  Abdomen: soft, non-tender, non-distended, normal bowel sounds, no masses or organomegaly  Local examination, wound healing well, no discharge, minimal swelling, minimal erythema, minimal tenderness. Recent Labs     09/25/22  1627 09/26/22  0300    139   K 4.0 3.8    106   CO2 22 22   BUN 16 16   CREATININE 0.9 0.9   GLUCOSE 111* 99   CALCIUM 9.3 9.0         Recent Labs     09/25/22  1627 09/26/22  0300   WBC 10.4 8.9   RBC 4.81 4.58   HGB 14.9 13.7   HCT 43.6 41.7   MCV 90.6 91.0   MCH 31.0 29.9   MCHC 34.2 32.9   RDW 13.2 13.2    155   MPV 10.9 11.6       A1c 5.1  Blood cultures, pending. Wound cultures, pending. Radiology:   XR HAND LEFT (MIN 3 VIEWS)   Final Result   Marked soft tissue swelling without evidence of acute fracture or radiopaque   foreign body. Assessment:    Principal Problem:    Cellulitis  Active Problems:    Hyperlipidemia    Obesity (BMI 30-39. 9)    Hyperglycemia  Resolved Problems:    * No resolved hospital problems. *      Plan:  Right hand wound, input from subspecialty pending, difficulty in obtaining orthopedic input yesterday, as of today definite concern over possible ligamentous injury, though no foreign body or broken bone on plain x-rays, likely may need CT or MRI to evaluate tendons further.   Patient is able to make a partial fist without difficulty, nursing were updated that even though chances of ligamentous injury are low we definitely need input from orthopedics. Preferably hand surgery. Infected wound, unfortunately no available cultures or cultures obtained prior to antibiotics, patient improving on Ancef, now has started with some drainage, will ask ID to see. DC planning, likely in a day or 2.         Electronically signed by Jerome Mayfield MD on 9/27/2022 at 8:37 AM

## 2022-09-27 NOTE — PLAN OF CARE
Problem: Pain  Goal: Verbalizes/displays adequate comfort level or baseline comfort level  9/27/2022 1016 by Aba Souza RN  Outcome: Progressing  9/27/2022 0010 by Bakari Sun RN  Outcome: Progressing

## 2022-09-27 NOTE — CONSULTS
[address] [place] [address] [facility]    Consultation Note     Admit Date: 2022  7:14 PM    Reason for Consult:   Cellulitis    Attending Physician:  Esequiel Olsen*     Chief Complaint: left hand redness and drainage. HISTORY OF PRESENT ILLNESS:   The patient is a 41 y.o.  male not known to the Infectious Diseases service. The patient presented to the ER yesterday with left hand pain and swelling he cut his hand on a muffler on Friday and was seen at the urgent care had sutures placed and was prescribed Bactrim. He took only 1 dose ton the day of admission. Stitches came out yesterday and the hand became very swollen. Now its draining. And redness progressed over to the fore arm. Since admission he had a T-max of 100.3 hemodynamically stable saturating well on room air. Labs showed normal CBC and BMP. Blood cultures are in process. Wound culture showed no growth so far patient is on IV cefazolin I got consult further recommendations. Past Medical History:        Diagnosis Date    Hyperlipidemia      Past Surgical History:        Procedure Laterality Date    KNEE ARTHROSCOPY       Current Medications:   Scheduled Meds:   atorvastatin  20 mg Oral Daily    sodium chloride flush  5-40 mL IntraVENous 2 times per day    ceFAZolin  1,000 mg IntraVENous Q8H    FLUoxetine  20 mg Oral Daily     Continuous Infusions:   sodium chloride       PRN Meds:sodium chloride flush, sodium chloride, ondansetron **OR** ondansetron, polyethylene glycol, acetaminophen **OR** acetaminophen, ketorolac    Allergies:  Patient has no known allergies.     Social History:   Social History     Socioeconomic History    Marital status:    Tobacco Use    Smoking status: Former     Packs/day: 1.00     Types: Cigarettes     Quit date: 2014     Years since quittin.0    Smokeless tobacco: Current     Types: Chew   Vaping Use    Vaping Use: Never used   Substance and Sexual Activity    Alcohol use: Yes     Comment: few drinks weekly    Drug use: Never       Family History:       Problem Relation Age of Onset    Diabetes Type 1  Other    . Otherwise non-pertinent to the chief complaint. REVIEW OF SYSTEMS:    As mentioned in HPI, all other systems negative. PHYSICAL EXAM:    Vitals:    /72   Pulse 77   Temp 98.7 °F (37.1 °C) (Oral)   Resp 14   Ht 6' (1.829 m)   Wt 275 lb (124.7 kg)   SpO2 96%   BMI 37.30 kg/m²   Constitutional: The patient is awake, alert, and oriented. Skin: Warm and dry. No jaundice. HEENT: Eyes show round, and reactive pupils. Moist mucous membranes, no ulcerations, no thrush. Neck: Supple to movements. No lymphadenopathy. Chest: No use of accessory muscles to breathe. Symmetrical expansion. Auscultation reveals no wheezing, crackles, or rhonchi. Cardiovascular: S1 and S2 are rhythmic and regular. No murmurs appreciated. Abdomen: soft, non tender  Extremities: No clubbing, no cyanosis, no edema.   Musculoskeletal: left hand swollen with wound with significant cellulitis and purulent drainage  Neurological: alert, oriented x 3  Lines: peripheral      CBC+dif:  Recent Labs     09/25/22  1627 09/26/22  0300   WBC 10.4 8.9   HGB 14.9 13.7   HCT 43.6 41.7   MCV 90.6 91.0    155   NEUTROABS 8.29* 6.30     No results found for: CRP  No results found for: CRPHS  No results found for: SEDRATE  No results found for: ALT, AST, GGT, ALKPHOS, BILITOT  Lab Results   Component Value Date/Time     09/26/2022 03:00 AM    K 3.8 09/26/2022 03:00 AM     09/26/2022 03:00 AM    CO2 22 09/26/2022 03:00 AM    BUN 16 09/26/2022 03:00 AM    CREATININE 0.9 09/26/2022 03:00 AM    GFRAA >60 09/26/2022 03:00 AM    LABGLOM >60 09/26/2022 03:00 AM    GLUCOSE 99 09/26/2022 03:00 AM    CALCIUM 9.0 09/26/2022 03:00 AM       No results found for: PROTIME, INR    No results found for: TSH    Lab Results   Component Value Date/Time    COLORU Yellow 08/27/2019 10:41 AM PHUR 7.0 08/27/2019 10:41 AM    CLARITYU Clear 08/27/2019 10:41 AM    SPECGRAV <=1.005 08/27/2019 10:41 AM    LEUKOCYTESUR Negative 08/27/2019 10:41 AM    UROBILINOGEN 0.2 08/27/2019 10:41 AM    BILIRUBINUR Negative 08/27/2019 10:41 AM    BLOODU Negative 08/27/2019 10:41 AM    GLUCOSEU Negative 08/27/2019 10:41 AM       No results found for: XQB0ICI, BEART, V8GUYXWL, PHART, THGBART, ZRT1BMA, PO2ART, AMA9GZY  Radiology:  XR HAND LEFT (MIN 3 VIEWS)   Final Result   Marked soft tissue swelling without evidence of acute fracture or radiopaque   foreign body. Microbiology:  Pending  Recent Labs     09/25/22  2200   BC 24 Hours no growth     No results for input(s): ORG in the last 72 hours. Recent Labs     09/25/22  2200   BLOODCULT2 24 Hours no growth     No results for input(s): STREPNEUMAGU in the last 72 hours. No results for input(s): LP1UAG in the last 72 hours. No results for input(s): ASO in the last 72 hours. No results for input(s): CULTRESP in the last 72 hours. Assessment:  Left hand abscess with open wound: Trauma related      Plan:    Stop cefazolin  Started on IV Vancomycin (PTD)  Awaiting orthopedic or hand surgery consultation. I think he needs debridement. Repeat wound cx sent. Will follow with you    Thank you for having us see this patient in consultation. I will be discussing this case with the treating physicians.       Electronically signed by Georgeanna Eisenmenger, MD on 9/27/2022 at 3:55 PM

## 2022-09-27 NOTE — PROGRESS NOTES
-Consulted to dose vancomycin for skin and soft tissue infection.  -Will order vancomycin 2500 mg IV x 1 loading dose now. -Will place order for vancomycin 1500 mg IV q12h to begin on 9/28 (0700). -Projected AUC/LIDIA is 482. -Will monitor renal function and steady state vancomycin level when appropriate.

## 2022-09-27 NOTE — PROGRESS NOTES
Spoke to Dr. Jodie Kathleen regarding consult.     Electronically signed by Parker Herman RN on 9/27/2022 at 1:19 PM

## 2022-09-28 ENCOUNTER — ANESTHESIA (OUTPATIENT)
Dept: OPERATING ROOM | Age: 47
DRG: 580 | End: 2022-09-28
Payer: COMMERCIAL

## 2022-09-28 ENCOUNTER — ANESTHESIA EVENT (OUTPATIENT)
Dept: OPERATING ROOM | Age: 47
DRG: 580 | End: 2022-09-28
Payer: COMMERCIAL

## 2022-09-28 LAB
GRAM STAIN ORDERABLE: NORMAL
WOUND/ABSCESS: NORMAL

## 2022-09-28 PROCEDURE — 1200000000 HC SEMI PRIVATE

## 2022-09-28 PROCEDURE — 3700000000 HC ANESTHESIA ATTENDED CARE: Performed by: ORTHOPAEDIC SURGERY

## 2022-09-28 PROCEDURE — 87070 CULTURE OTHR SPECIMN AEROBIC: CPT

## 2022-09-28 PROCEDURE — 6370000000 HC RX 637 (ALT 250 FOR IP): Performed by: ORTHOPAEDIC SURGERY

## 2022-09-28 PROCEDURE — 2709999900 HC NON-CHARGEABLE SUPPLY: Performed by: ORTHOPAEDIC SURGERY

## 2022-09-28 PROCEDURE — 99233 SBSQ HOSP IP/OBS HIGH 50: CPT | Performed by: INTERNAL MEDICINE

## 2022-09-28 PROCEDURE — 6360000002 HC RX W HCPCS: Performed by: ORTHOPAEDIC SURGERY

## 2022-09-28 PROCEDURE — 2580000003 HC RX 258: Performed by: NURSE ANESTHETIST, CERTIFIED REGISTERED

## 2022-09-28 PROCEDURE — 6360000002 HC RX W HCPCS: Performed by: NURSE ANESTHETIST, CERTIFIED REGISTERED

## 2022-09-28 PROCEDURE — 3700000001 HC ADD 15 MINUTES (ANESTHESIA): Performed by: ORTHOPAEDIC SURGERY

## 2022-09-28 PROCEDURE — 87077 CULTURE AEROBIC IDENTIFY: CPT

## 2022-09-28 PROCEDURE — 2580000003 HC RX 258: Performed by: ORTHOPAEDIC SURGERY

## 2022-09-28 PROCEDURE — 7100000011 HC PHASE II RECOVERY - ADDTL 15 MIN: Performed by: ORTHOPAEDIC SURGERY

## 2022-09-28 PROCEDURE — 0LQ80ZZ REPAIR LEFT HAND TENDON, OPEN APPROACH: ICD-10-PCS | Performed by: ORTHOPAEDIC SURGERY

## 2022-09-28 PROCEDURE — 7100000010 HC PHASE II RECOVERY - FIRST 15 MIN: Performed by: ORTHOPAEDIC SURGERY

## 2022-09-28 PROCEDURE — 3600000013 HC SURGERY LEVEL 3 ADDTL 15MIN: Performed by: ORTHOPAEDIC SURGERY

## 2022-09-28 PROCEDURE — 6360000002 HC RX W HCPCS: Performed by: STUDENT IN AN ORGANIZED HEALTH CARE EDUCATION/TRAINING PROGRAM

## 2022-09-28 PROCEDURE — 6360000002 HC RX W HCPCS

## 2022-09-28 PROCEDURE — 87205 SMEAR GRAM STAIN: CPT

## 2022-09-28 PROCEDURE — 87186 SC STD MICRODIL/AGAR DIL: CPT

## 2022-09-28 PROCEDURE — 3600000003 HC SURGERY LEVEL 3 BASE: Performed by: ORTHOPAEDIC SURGERY

## 2022-09-28 PROCEDURE — 0LD80ZZ EXTRACTION OF LEFT HAND TENDON, OPEN APPROACH: ICD-10-PCS | Performed by: ORTHOPAEDIC SURGERY

## 2022-09-28 PROCEDURE — 2580000003 HC RX 258: Performed by: STUDENT IN AN ORGANIZED HEALTH CARE EDUCATION/TRAINING PROGRAM

## 2022-09-28 PROCEDURE — 87075 CULTR BACTERIA EXCEPT BLOOD: CPT

## 2022-09-28 PROCEDURE — 2580000003 HC RX 258

## 2022-09-28 RX ORDER — SODIUM CHLORIDE 0.9 % (FLUSH) 0.9 %
5-40 SYRINGE (ML) INJECTION EVERY 12 HOURS SCHEDULED
Status: DISCONTINUED | OUTPATIENT
Start: 2022-09-28 | End: 2022-09-29 | Stop reason: SDUPTHER

## 2022-09-28 RX ORDER — SODIUM CHLORIDE 0.9 % (FLUSH) 0.9 %
5-40 SYRINGE (ML) INJECTION PRN
Status: CANCELLED | OUTPATIENT
Start: 2022-09-28

## 2022-09-28 RX ORDER — FENTANYL CITRATE 50 UG/ML
50 INJECTION, SOLUTION INTRAMUSCULAR; INTRAVENOUS EVERY 5 MIN PRN
Status: CANCELLED | OUTPATIENT
Start: 2022-09-28

## 2022-09-28 RX ORDER — SODIUM CHLORIDE 0.9 % (FLUSH) 0.9 %
5-40 SYRINGE (ML) INJECTION PRN
Status: DISCONTINUED | OUTPATIENT
Start: 2022-09-28 | End: 2022-09-29 | Stop reason: SDUPTHER

## 2022-09-28 RX ORDER — PROPOFOL 10 MG/ML
INJECTION, EMULSION INTRAVENOUS PRN
Status: DISCONTINUED | OUTPATIENT
Start: 2022-09-28 | End: 2022-09-28 | Stop reason: SDUPTHER

## 2022-09-28 RX ORDER — SODIUM CHLORIDE 9 MG/ML
INJECTION, SOLUTION INTRAVENOUS CONTINUOUS PRN
Status: DISCONTINUED | OUTPATIENT
Start: 2022-09-28 | End: 2022-09-28 | Stop reason: SDUPTHER

## 2022-09-28 RX ORDER — FENTANYL CITRATE 50 UG/ML
25 INJECTION, SOLUTION INTRAMUSCULAR; INTRAVENOUS EVERY 5 MIN PRN
Status: CANCELLED | OUTPATIENT
Start: 2022-09-28

## 2022-09-28 RX ORDER — SODIUM CHLORIDE 9 MG/ML
INJECTION, SOLUTION INTRAVENOUS PRN
Status: CANCELLED | OUTPATIENT
Start: 2022-09-28

## 2022-09-28 RX ORDER — FENTANYL CITRATE 50 UG/ML
INJECTION, SOLUTION INTRAMUSCULAR; INTRAVENOUS PRN
Status: DISCONTINUED | OUTPATIENT
Start: 2022-09-28 | End: 2022-09-28 | Stop reason: SDUPTHER

## 2022-09-28 RX ORDER — MIDAZOLAM HYDROCHLORIDE 1 MG/ML
INJECTION INTRAMUSCULAR; INTRAVENOUS PRN
Status: DISCONTINUED | OUTPATIENT
Start: 2022-09-28 | End: 2022-09-28 | Stop reason: SDUPTHER

## 2022-09-28 RX ORDER — SODIUM CHLORIDE 0.9 % (FLUSH) 0.9 %
5-40 SYRINGE (ML) INJECTION EVERY 12 HOURS SCHEDULED
Status: CANCELLED | OUTPATIENT
Start: 2022-09-28

## 2022-09-28 RX ORDER — SODIUM CHLORIDE 9 MG/ML
INJECTION, SOLUTION INTRAVENOUS PRN
Status: DISCONTINUED | OUTPATIENT
Start: 2022-09-28 | End: 2022-09-29 | Stop reason: SDUPTHER

## 2022-09-28 RX ORDER — ONDANSETRON 2 MG/ML
4 INJECTION INTRAMUSCULAR; INTRAVENOUS
Status: CANCELLED | OUTPATIENT
Start: 2022-09-28 | End: 2022-09-29

## 2022-09-28 RX ADMIN — SODIUM CHLORIDE: 9 INJECTION, SOLUTION INTRAVENOUS at 16:29

## 2022-09-28 RX ADMIN — Medication 10 ML: at 08:58

## 2022-09-28 RX ADMIN — FLUOXETINE HYDROCHLORIDE 20 MG: 20 CAPSULE ORAL at 18:42

## 2022-09-28 RX ADMIN — MIDAZOLAM 2 MG: 1 INJECTION INTRAMUSCULAR; INTRAVENOUS at 16:29

## 2022-09-28 RX ADMIN — VANCOMYCIN HYDROCHLORIDE 1500 MG: 10 INJECTION, POWDER, LYOPHILIZED, FOR SOLUTION INTRAVENOUS at 06:35

## 2022-09-28 RX ADMIN — KETOROLAC TROMETHAMINE 30 MG: 30 INJECTION, SOLUTION INTRAMUSCULAR; INTRAVENOUS at 06:35

## 2022-09-28 RX ADMIN — SODIUM CHLORIDE, PRESERVATIVE FREE 10 ML: 5 INJECTION INTRAVENOUS at 18:43

## 2022-09-28 RX ADMIN — KETOROLAC TROMETHAMINE 30 MG: 30 INJECTION, SOLUTION INTRAMUSCULAR; INTRAVENOUS at 18:43

## 2022-09-28 RX ADMIN — FENTANYL CITRATE 50 MCG: 50 INJECTION, SOLUTION INTRAMUSCULAR; INTRAVENOUS at 16:33

## 2022-09-28 RX ADMIN — PROPOFOL 100 MCG/KG/MIN: 10 INJECTION, EMULSION INTRAVENOUS at 16:38

## 2022-09-28 RX ADMIN — SODIUM CHLORIDE, PRESERVATIVE FREE 10 ML: 5 INJECTION INTRAVENOUS at 20:44

## 2022-09-28 RX ADMIN — VANCOMYCIN HYDROCHLORIDE 1500 MG: 10 INJECTION, POWDER, LYOPHILIZED, FOR SOLUTION INTRAVENOUS at 18:46

## 2022-09-28 RX ADMIN — ATORVASTATIN CALCIUM 20 MG: 20 TABLET, FILM COATED ORAL at 18:42

## 2022-09-28 RX ADMIN — PROPOFOL 100 MG: 10 INJECTION, EMULSION INTRAVENOUS at 16:33

## 2022-09-28 RX ADMIN — FENTANYL CITRATE 50 MCG: 50 INJECTION, SOLUTION INTRAMUSCULAR; INTRAVENOUS at 16:45

## 2022-09-28 ASSESSMENT — PAIN DESCRIPTION - PAIN TYPE
TYPE: SURGICAL PAIN
TYPE: SURGICAL PAIN
TYPE: ACUTE PAIN;SURGICAL PAIN
TYPE: SURGICAL PAIN
TYPE: SURGICAL PAIN

## 2022-09-28 ASSESSMENT — PAIN DESCRIPTION - ORIENTATION
ORIENTATION: LEFT

## 2022-09-28 ASSESSMENT — PAIN DESCRIPTION - ONSET: ONSET: ON-GOING

## 2022-09-28 ASSESSMENT — PAIN DESCRIPTION - LOCATION
LOCATION: HAND

## 2022-09-28 ASSESSMENT — PAIN SCALES - GENERAL
PAINLEVEL_OUTOF10: 0
PAINLEVEL_OUTOF10: 3
PAINLEVEL_OUTOF10: 0
PAINLEVEL_OUTOF10: 2
PAINLEVEL_OUTOF10: 4

## 2022-09-28 ASSESSMENT — LIFESTYLE VARIABLES: SMOKING_STATUS: 0

## 2022-09-28 ASSESSMENT — PAIN DESCRIPTION - DESCRIPTORS: DESCRIPTORS: ACHING;DISCOMFORT

## 2022-09-28 ASSESSMENT — PAIN DESCRIPTION - FREQUENCY: FREQUENCY: INTERMITTENT

## 2022-09-28 NOTE — CONSULTS
Orthopaedic Consultation  SURI Mae MD      CHIEF COMPLAINT:  Left hand infection    History of Present Illness:    Michael Patton is a 40 y/o male who presented to the ER on Sunday with a left hand infection. He says a few days prior he cut his left index finger at work on a [employer]. He was initially seen at an urgent care and had his hand washed out closed. Since he has developed erythema and swelling and has been unable to extend his middle finger. Currenlty pain is 5/10. He is right hand dominant. Past Medical History:   Diagnosis Date    Hyperlipidemia          Past Surgical History:   Procedure Laterality Date    KNEE ARTHROSCOPY         Medications Prior to Admission:    Medications Prior to Admission: atorvastatin (LIPITOR) 20 MG tablet, Take 20 mg by mouth daily  meloxicam (MOBIC) 15 MG tablet, Take 15 mg by mouth daily  FLUoxetine (PROZAC) 20 MG capsule, Take 20 mg by mouth daily    Allergies:    Patient has no known allergies. Social History:    reports that he quit smoking about 8 years ago. His smoking use included cigarettes. He smoked an average of 1 pack per day. His smokeless tobacco use includes chew. He reports current alcohol use. He reports that he does not use drugs. Family History:   family history includes Diabetes Type 1  in an other family member. REVIEW OF SYSTEMS:  As above in the HPI, otherwise negative    PHYSICAL EXAM:    Vitals:  BP (!) 120/59   Pulse 91   Temp 98 °F (36.7 °C) (Oral)   Resp 16   Ht 6' (1.829 m)   Wt 275 lb (124.7 kg)   SpO2 97%   BMI 37.30 kg/m²     General:  Well nourished, well developed, pleasant, alert and oriented x 3  HEENT:  Normocephalic, atraumatic.   EOM intact, sclera clear   Neck:  Supple  Cardiovascular:  No apparent abnormalities, no lower leg edema, no varicosities, pedal pulses are palpable  Lungs:  Breathing not labored, no acute distress  Abdomen:  Soft, nontender  Skin:  Warm and dry, no open lesions or rash  Neuro: Sensation intact to light touch in bilateral upper and lower extremities    Left hand:     2 cm incision over left middle MCP joint  Mild erythema, small amount of purulent drainge  Unable to extend middle finger  Otherwise neurovascular intact      IMAGES: Xray left hand - no fracture    ASSESSMENT:    Left middle finger extensor tendon laceration  Left hand infection      PLAN:  I had a long discussion with the patient regarding his left hand infection and extensor tendon laceration. Left hand I&D was discussed. He will require a tendon repair at a latera date. The risks and benefits of surgery were discussed and all questions were answered. He would like to proceed with surgery. - Medical clearance  - NPO at midnight  - IVF  - Continue abx  - Pain control  - Plan for OR tomorrow    I spent over 50 minutes reviewing the EMR, radiographs, examining the patient, and discussing the injury and treatment plan with the patient and his wife.

## 2022-09-28 NOTE — PROGRESS NOTES
[facility] Hospitalist   Progress Note    Admitting Date and Time: 9/25/2022  7:14 PM  Admit Dx: Cellulitis [L03.90]  Injury of left hand, initial encounter [S69.92XA]  Cellulitis of other specified site [L03.818]    Subjective: Patient came to ED on 25th with left hand pain and swelling. Injured while working on car and did have stitches taken  at urgent care. Patient with mild tachycardia on presentation but nontoxic. Admitted with cellulitis. Patient with hyperlipidemia and no fracture or foreign body noted on x-rays in ED. unable to get orthopedic input, patient with worsening local examination, evaluated by ID, concern of deep tissue infection, patient started on vancomycin and cefazolin stopped. Did recommend debridement with orthopedics. Patient seen by orthopedics, impression of left middle finger extensor tendon laceration. Patient will need incision and drainage as well as tendon repair at a later date    Patient was admitted with Cellulitis [L03.90]  Injury of left hand, initial encounter [S69.92XA]  Cellulitis of other specified site [L03.818]. Patient is awake, alert, sitting by window, sitting in the bed, working on computer, does communicate well, denies any overnight issues, does feel better. Updated with the plans so far including input from ID and orthopedics. Per RN: Updated nursing to let orthopedics know that patient is cleared medically.     ROS: denies fever, chills, cp, sob, n/v, HA unless stated above.     vancomycin  1,500 mg IntraVENous Q12H    atorvastatin  20 mg Oral Daily    sodium chloride flush  5-40 mL IntraVENous 2 times per day    FLUoxetine  20 mg Oral Daily     sodium chloride flush, 5-40 mL, PRN  sodium chloride, , PRN  ondansetron, 4 mg, Q8H PRN   Or  ondansetron, 4 mg, Q6H PRN  polyethylene glycol, 17 g, Daily PRN  acetaminophen, 650 mg, Q6H PRN   Or  acetaminophen, 650 mg, Q6H PRN  ketorolac, 30 mg, Q6H PRN       Objective:    /73   Pulse 67   Temp 97.6 °F (36.4 °C) (Oral)   Resp 16   Ht 6' (1.829 m)   Wt 275 lb (124.7 kg)   SpO2 95%   BMI 37.30 kg/m²   General Appearance: alert and oriented to person, place and time, well-developed and well-nourished, in no acute distress  Skin: warm and dry, no rash or erythema  Head: normocephalic and atraumatic  Eyes: pupils equal, round, and reactive to light, extraocular eye movements intact, conjunctivae normal  ENT: tympanic membrane, external ear and ear canal normal bilaterally, oropharynx clear and moist with normal mucous membranes  Neck: neck supple and non tender without mass, no thyromegaly or thyroid nodules, no cervical lymphadenopathy   Pulmonary/Chest: clear to auscultation bilaterally- no wheezes, rales or rhonchi, normal air movement, no respiratory distress  Cardiovascular: normal rate, normal S1 and S2, no gallops, intact distal pulses, and no carotid bruits  Abdomen: soft, non-tender, non-distended, normal bowel sounds, no masses or organomegaly  Local examination, improving compared to yesterday. Recent Labs     09/25/22  1627 09/26/22  0300    139   K 4.0 3.8    106   CO2 22 22   BUN 16 16   CREATININE 0.9 0.9   GLUCOSE 111* 99   CALCIUM 9.3 9.0         Recent Labs     09/25/22  1627 09/26/22  0300   WBC 10.4 8.9   RBC 4.81 4.58   HGB 14.9 13.7   HCT 43.6 41.7   MCV 90.6 91.0   MCH 31.0 29.9   MCHC 34.2 32.9   RDW 13.2 13.2    155   MPV 10.9 11.6       A1c 5.1  Blood cultures, no growth at 24 hours, final results pending  Wound cultures, pending. Radiology:   XR HAND LEFT (MIN 3 VIEWS)   Final Result   Marked soft tissue swelling without evidence of acute fracture or radiopaque   foreign body. Assessment:    Principal Problem:    Cellulitis  Active Problems:    Hyperlipidemia    Obesity (BMI 30-39. 9)    Hyperglycemia  Resolved Problems:    * No resolved hospital problems.  *      Plan:  Left hand wound, patient seen by orthopedics, plan for incision and drainage today, patient is n.p.o., patient cleared medically for surgery within acceptable level of risk, nursing updated to notify orthopedic surgery. Patient with injury to tendon which will require repair at a later date. Infected wound, seen by infectious disease, antibiotics changed to vancomycin, today is day 2. Hyperlipidemia, stable on statins. Depression, patient is on his home dose of Prozac. DC planning, not ready yet, likely will depend upon once surgery is completed plans from infectious disease for further antibiotics. Possibly in next 2 days.         Electronically signed by Mitzi Ortega MD on 9/28/2022 at 8:23 AM

## 2022-09-28 NOTE — ANESTHESIA PRE PROCEDURE
Department of Anesthesiology  Preprocedure Note       Name:  Luh Reina   Age:  41 y.o.  :  1975                                          MRN:  [id number]         Date:  2022      Surgeon: Greg Brady):  Luisana Winters MD    Procedure: Procedure(s):  HAND INCISION AND DRAINAGE    Medications prior to admission:   Prior to Admission medications    Medication Sig Start Date End Date Taking? Authorizing Provider   atorvastatin (LIPITOR) 20 MG tablet Take 20 mg by mouth daily    Historical Provider, MD   meloxicam (MOBIC) 15 MG tablet Take 15 mg by mouth daily    Historical Provider, MD   FLUoxetine (PROZAC) 20 MG capsule Take 20 mg by mouth daily    Historical Provider, MD       Current medications:    No current facility-administered medications for this visit. No current outpatient medications on file.      Facility-Administered Medications Ordered in Other Visits   Medication Dose Route Frequency Provider Last Rate Last Admin    vancomycin 1500 mg in dextrose 5% 300 mL IVPB  1,500 mg IntraVENous Q12H Mary Agee MD   Stopped at 22 0856    atorvastatin (LIPITOR) tablet 20 mg  20 mg Oral Daily Beatriz J Addicott, APRN - CNP   20 mg at 22 3362    sodium chloride flush 0.9 % injection 5-40 mL  5-40 mL IntraVENous 2 times per day Beatriz J Addicott, APRN - CNP   10 mL at 22 0858    sodium chloride flush 0.9 % injection 5-40 mL  5-40 mL IntraVENous PRN Beatriz J Addicott, APRN - CNP        0.9 % sodium chloride infusion   IntraVENous PRN Beatriz HUMPHREY Addicott, APRN - CNP        ondansetron (ZOFRAN-ODT) disintegrating tablet 4 mg  4 mg Oral Q8H PRN Beatriz J Addicott, APRN - CNP        Or    ondansetron (ZOFRAN) injection 4 mg  4 mg IntraVENous Q6H PRN Beatriz J Addicott, APRN - CNP        polyethylene glycol (GLYCOLAX) packet 17 g  17 g Oral Daily PRN Beatriz HUMPHREY Addicott, APRN - CNP        acetaminophen (TYLENOL) tablet 650 mg  650 mg Oral Q6H PRN Beatriz J Addicott, APRN - CNP   650 mg at 22 1809    Or    acetaminophen (TYLENOL) suppository 650 mg  650 mg Rectal Q6H PRN Beatriz Mccoyott, APRN - CNP        ketorolac (TORADOL) injection 30 mg  30 mg IntraVENous Q6H PRN Beatriz Nina, APRN - CNP   30 mg at 22 0635    FLUoxetine (PROZAC) capsule 20 mg  20 mg Oral Daily Beatriz Nina, APRN - CNP   20 mg at 22 0233       Allergies:  No Known Allergies    Problem List:    Patient Active Problem List   Diagnosis Code    Cellulitis L03.90    Hyperlipidemia E78.5    Obesity (BMI 30-39. 9) E66.9    Hyperglycemia R73.9       Past Medical History:        Diagnosis Date    Hyperlipidemia        Past Surgical History:        Procedure Laterality Date    KNEE ARTHROSCOPY         Social History:    Social History     Tobacco Use    Smoking status: Former     Packs/day: 1.00     Types: Cigarettes     Quit date: 2014     Years since quittin.0    Smokeless tobacco: Current     Types: Chew   Substance Use Topics    Alcohol use: Yes     Comment: few drinks weekly                                Ready to quit: Not Answered  Counseling given: Not Answered      Vital Signs (Current): There were no vitals filed for this visit.                                            BP Readings from Last 3 Encounters:   22 118/73   10/15/21 (!) 160/75   20 (!) 157/84       NPO Status:                                                                                 BMI:   Wt Readings from Last 3 Encounters:   22 275 lb (124.7 kg)   10/15/21 275 lb (124.7 kg)   20 265 lb (120.2 kg)     There is no height or weight on file to calculate BMI.    CBC:   Lab Results   Component Value Date/Time    WBC 8.9 2022 03:00 AM    RBC 4.58 2022 03:00 AM    HGB 13.7 2022 03:00 AM    HCT 41.7 2022 03:00 AM    MCV 91.0 2022 03:00 AM    RDW 13.2 2022 03:00 AM     2022 03:00 AM       CMP:   Lab Results   Component Value Date/Time    NA 139 09/26/2022 03:00 AM    K 3.8 09/26/2022 03:00 AM     09/26/2022 03:00 AM    CO2 22 09/26/2022 03:00 AM    BUN 16 09/26/2022 03:00 AM    CREATININE 0.9 09/26/2022 03:00 AM    GFRAA >60 09/26/2022 03:00 AM    LABGLOM >60 09/26/2022 03:00 AM    GLUCOSE 99 09/26/2022 03:00 AM    CALCIUM 9.0 09/26/2022 03:00 AM       POC Tests: No results for input(s): POCGLU, POCNA, POCK, POCCL, POCBUN, POCHEMO, POCHCT in the last 72 hours. Coags: No results found for: PROTIME, INR, APTT    HCG (If Applicable): No results found for: PREGTESTUR, PREGSERUM, HCG, HCGQUANT     ABGs: No results found for: PHART, PO2ART, VJZ3VJP, ZWA4HJM, BEART, J7LZHVNJ     Type & Screen (If Applicable):  No results found for: LABABO, LABRH    Drug/Infectious Status (If Applicable):  No results found for: HIV, HEPCAB    COVID-19 Screening (If Applicable): No results found for: COVID19        Anesthesia Evaluation  Patient summary reviewed and Nursing notes reviewed no history of anesthetic complications:   Airway: Mallampati: II  TM distance: >3 FB   Neck ROM: full  Mouth opening: > = 3 FB   Dental: normal exam         Pulmonary: breath sounds clear to auscultation      (-) not a current smoker                          ROS comment: Family states he snores severely   Cardiovascular:  Exercise tolerance: good (>4 METS),   (+) hyperlipidemia        Rhythm: regular  Rate: normal           Beta Blocker:  Not on Beta Blocker         Neuro/Psych:   (+) depression/anxiety             GI/Hepatic/Renal:            ROS comment: BMI 37.   Endo/Other: Negative Endo/Other ROS                    Abdominal:   (+) obese,           Vascular: negative vascular ROS. Other Findings:             Anesthesia Plan      MAC     ASA 2       Induction: intravenous. Anesthetic plan and risks discussed with patient. Plan discussed with attending.     Attending anesthesiologist reviewed and agrees with Devin Teixeira MD

## 2022-09-28 NOTE — PROGRESS NOTES
Pharmacy Consultation Note  (Antibiotic Dosing and Monitoring)    Initial consult date: 9/27/2022  Consulting physician/provider: Dr. Werner Hatfield  Drug: Vancomycin  Indication: SSTI    Age/  Gender Height Weight IBW  Allergy Information   47 y.o./male 6' (182.9 cm) 275 lb (124.7 kg)     Ideal body weight: 77.6 kg (171 lb 1.2 oz)  Adjusted ideal body weight: 96.5 kg (212 lb 10.3 oz)   Patient has no known allergies. Renal Function:  Recent Labs     09/25/22  1627 09/26/22  0300   BUN 16 16   CREATININE 0.9 0.9       Intake/Output Summary (Last 24 hours) at 9/28/2022 1243  Last data filed at 9/27/2022 1746  Gross per 24 hour   Intake 240 ml   Output --   Net 240 ml       Vancomycin Monitoring:  Trough:  No results for input(s): VANCOTROUGH in the last 72 hours. Random:  No results for input(s): VANCORANDOM in the last 72 hours. Recent Labs     09/25/22  2200   BLOODCULT2 24 Hours no growth        Historical Cultures:  No results found for: ORG  Recent Labs     09/25/22  2200   BC 24 Hours no growth       Vancomycin Administration Times:  Recent vancomycin administrations                     vancomycin 1500 mg in dextrose 5% 300 mL IVPB (mg) 1,500 mg New Bag 09/28/22 0635    vancomycin (VANCOCIN) 2,500 mg in dextrose 5 % 500 mL IVPB (mg) 2,500 mg New Bag 09/27/22 1836                    Assessment:  Patient is a 41 y.o. male who has been initiated on vancomycin  Estimated Creatinine Clearance: 141 mL/min (based on SCr of 0.9 mg/dL). To dose vancomycin, pharmacy will be utilizing [employer] calculation software for goal AUC/LIDIA 400-600 mg/L-hr    Plan:   Will continue vancomycin 1500 mg IV every 12 hours  Will check vancomycin levels when appropriate  Will continue to monitor renal function   Pharmacy to follow    Josue Hunt, Jonh, BCCCP  9/28/2022  12:44 PM

## 2022-09-28 NOTE — ANESTHESIA POSTPROCEDURE EVALUATION
Department of Anesthesiology  Postprocedure Note    Patient: Kaya Gill  MRN: [id number]  YOB: 1975  Date of evaluation: 9/28/2022      Procedure Summary     Date: 09/28/22 Room / Location: [facility] 01 / [facility]    Anesthesia Start: 8582 Anesthesia Stop: 8441    Procedure: HAND INCISION AND DRAINAGE (Left: Hand) Diagnosis:       Abscess      (Abscess [L02.91])    Surgeons: Hemalatha Fishman MD Responsible Provider: Nicky Reese MD    Anesthesia Type: MAC ASA Status: 2          Anesthesia Type: No value filed.     Mariana Phase I: Mariana Score: 10    Mariana Phase II:        Anesthesia Post Evaluation    Patient location during evaluation: PACU  Patient participation: complete - patient participated  Level of consciousness: awake and alert  Pain score: 2  Airway patency: patent  Nausea & Vomiting: no nausea and no vomiting  Complications: no  Cardiovascular status: blood pressure returned to baseline  Respiratory status: acceptable  Hydration status: euvolemic

## 2022-09-28 NOTE — ANESTHESIA PRE PROCEDURE
Department of Anesthesiology  Preprocedure Note       Name:  Anish Kim   Age:  41 y.o.  :  1975                                          MRN:  [id number]         Date:  2022      Surgeon: Alana Monroe):  Maria Dolores Medellin MD    Procedure: Procedure(s):  HAND INCISION AND DRAINAGE    Medications prior to admission:   Prior to Admission medications    Medication Sig Start Date End Date Taking?  Authorizing Provider   atorvastatin (LIPITOR) 20 MG tablet Take 20 mg by mouth daily   Yes Historical Provider, MD   meloxicam (MOBIC) 15 MG tablet Take 15 mg by mouth daily   Yes Historical Provider, MD   FLUoxetine (PROZAC) 20 MG capsule Take 20 mg by mouth daily   Yes Historical Provider, MD       Current medications:    Current Facility-Administered Medications   Medication Dose Route Frequency Provider Last Rate Last Admin    vancomycin 1500 mg in dextrose 5% 300 mL IVPB  1,500 mg IntraVENous Q12H Edgar Graham MD   Stopped at 22 0856    atorvastatin (LIPITOR) tablet 20 mg  20 mg Oral Daily Beatrizsean Mccoyott, APRN - CNP   20 mg at 22 4003    sodium chloride flush 0.9 % injection 5-40 mL  5-40 mL IntraVENous 2 times per day Beatriz J Addicott, APRN - CNP   10 mL at 22 0858    sodium chloride flush 0.9 % injection 5-40 mL  5-40 mL IntraVENous PRN Beatriz HUMPHREY Addicott, APRN - CNP        0.9 % sodium chloride infusion   IntraVENous PRN Beatriz Mccoyott, APRN - CNP        ondansetron (ZOFRAN-ODT) disintegrating tablet 4 mg  4 mg Oral Q8H PRN Beatriz Mccoyott, APRN - CNP        Or    ondansetron (ZOFRAN) injection 4 mg  4 mg IntraVENous Q6H PRN Beatriz HUMPHREY Addicott, APRN - CNP        polyethylene glycol (GLYCOLAX) packet 17 g  17 g Oral Daily PRN Beatriz Mccoyott, APRN - CNP        acetaminophen (TYLENOL) tablet 650 mg  650 mg Oral Q6H PRN Beatriz Mccoyott, APRN - CNP   650 mg at 22 1809    Or    acetaminophen (TYLENOL) suppository 650 mg  650 mg Rectal Q6H PRN Beatriz HUMPHREY Addicott, APRN - CNP        ketorolac (TORADOL) injection 30 mg  30 mg IntraVENous Q6H PRN LASHON Willimason - CNP   30 mg at 22 0635    FLUoxetine (PROZAC) capsule 20 mg  20 mg Oral Daily LASHON Williamson - CNP   20 mg at 22 4532       Allergies:  No Known Allergies    Problem List:    Patient Active Problem List   Diagnosis Code    Cellulitis L03.90    Hyperlipidemia E78.5    Obesity (BMI 30-39. 9) E66.9    Hyperglycemia R73.9       Past Medical History:        Diagnosis Date    Hyperlipidemia        Past Surgical History:        Procedure Laterality Date    KNEE ARTHROSCOPY         Social History:    Social History     Tobacco Use    Smoking status: Former     Packs/day: 1.00     Types: Cigarettes     Quit date: 2014     Years since quittin.0    Smokeless tobacco: Current     Types: Chew   Substance Use Topics    Alcohol use: Yes     Comment: few drinks weekly                                Ready to quit: Not Answered  Counseling given: Not Answered      Vital Signs (Current):   Vitals:    22 0745 22 1900 22 0009 22 0704   BP: 132/72 (!) 120/59  118/73   Pulse: 77 91  67   Resp: 14 16  16   Temp: 37.1 °C (98.7 °F) 36.7 °C (98 °F)  36.4 °C (97.6 °F)   TempSrc: Oral Oral  Oral   SpO2: 96% 97%  95%   Weight:   275 lb (124.7 kg)    Height:                                                  BP Readings from Last 3 Encounters:   22 118/73   10/15/21 (!) 160/75   20 (!) 157/84       NPO Status: Time of last liquid consumption: 0000                        Time of last solid consumption: 0000                        Date of last liquid consumption: 22                        Date of last solid food consumption: 22    BMI:   Wt Readings from Last 3 Encounters:   22 275 lb (124.7 kg)   10/15/21 275 lb (124.7 kg)   20 265 lb (120.2 kg)     Body mass index is 37.3 kg/m².     CBC:   Lab Results   Component Value Date/Time    WBC 8.9 2022 03:00 AM    RBC 4.58 09/26/2022 03:00 AM    HGB 13.7 09/26/2022 03:00 AM    HCT 41.7 09/26/2022 03:00 AM    MCV 91.0 09/26/2022 03:00 AM    RDW 13.2 09/26/2022 03:00 AM     09/26/2022 03:00 AM       CMP:   Lab Results   Component Value Date/Time     09/26/2022 03:00 AM    K 3.8 09/26/2022 03:00 AM     09/26/2022 03:00 AM    CO2 22 09/26/2022 03:00 AM    BUN 16 09/26/2022 03:00 AM    CREATININE 0.9 09/26/2022 03:00 AM    GFRAA >60 09/26/2022 03:00 AM    LABGLOM >60 09/26/2022 03:00 AM    GLUCOSE 99 09/26/2022 03:00 AM    CALCIUM 9.0 09/26/2022 03:00 AM       POC Tests: No results for input(s): POCGLU, POCNA, POCK, POCCL, POCBUN, POCHEMO, POCHCT in the last 72 hours. Coags: No results found for: PROTIME, INR, APTT    HCG (If Applicable): No results found for: PREGTESTUR, PREGSERUM, HCG, HCGQUANT     ABGs: No results found for: PHART, PO2ART, QNK3FNI, CSM6YEM, BEART, E6RDOJVD     Type & Screen (If Applicable):  No results found for: LABABO, LABRH    Drug/Infectious Status (If Applicable):  No results found for: HIV, HEPCAB    COVID-19 Screening (If Applicable): No results found for: COVID19        Anesthesia Evaluation  Patient summary reviewed and Nursing notes reviewed no history of anesthetic complications:   Airway: Mallampati: II  TM distance: >3 FB   Neck ROM: full  Mouth opening: > = 3 FB   Dental: normal exam         Pulmonary: breath sounds clear to auscultation      (-) not a current smoker                          ROS comment: Family states he snores severely   Cardiovascular:  Exercise tolerance: good (>4 METS),   (+) hyperlipidemia        Rhythm: regular  Rate: normal           Beta Blocker:  Not on Beta Blocker         Neuro/Psych:   (+) depression/anxiety             GI/Hepatic/Renal:            ROS comment: BMI 37.   Endo/Other: Negative Endo/Other ROS                    Abdominal:   (+) obese,           Vascular: negative vascular ROS.          Other Findings:           Anesthesia

## 2022-09-28 NOTE — CARE COORDINATION
Chart reviewed. Plan for OR today per ortho, ID on case, patient in receipt of IV atb therapy. Would anticipate probable post discharge needs. As such, called [facility] [phone]. They have no claim on file. I have sent an email to [facility] personnel requesting assistance with establishing case#. Met with patient at bedside and asked him to contact his employer and to ensure they have reported injury. He agreed to do so. Referral initiated to [facility] who will need [facility] approval once case is established. The Plan for Transition of Care is related to the following treatment goals: [address]    The Patient  was provided with a choice of provider and agrees   with the discharge plan. [x] Yes [] No    Freedom of choice list was provided with basic dialogue that supports the patient's individualized plan of care/goals, treatment preferences and shares the quality data associated with the providers. [x] Yes [] No    Jairon Cardoso.  Micah, MSN RN  [facility] Case Management  [phone]

## 2022-09-28 NOTE — OP NOTE
Operative Report  Kathrine Claros  [id number]  9/28/2022    Pre-operative diagnosis:  1. Left hand infection      2. Left middle finger extensor tendon laceration      3. Left arm cellulitis    Post-operative diagnosis:  1. Left hand infection      2. Left middle finger extensor tendon laceration      3. Left arm cellulitis    Procedure: Irrigation and non excisional debridement left middle finger    Surgeon: Preston Peres MD    Assistant:  None    Anesthesia:  MAC    Operative Indication:  41 y.o. male presented with a laceration of the left middle finger over the MCP joint. He was placed on IV abx but continued to have some drainage. Patient was admitted for pain control, medical optimization and definitive surgical treatment. The rationale behind irrigation and debridment was explained and informed consent was obtained following a thorough review of risks, benefits, and alternative treatment options. The typical post-operative recovery process was also discussed. The risks for surgery include bleeding, infection, damage to blood vessels, damage to nerves, risk of further surgery, chronic pain, restricted range of motion, risk of continued discomfort, further procedures, risk of need for altered activities and altered gait, risk of blood clots, pulmonary embolism, myocardial infarction, and risk of death were discussed. The patient understood these risks and consented for surgery    EBL: 10 cc    Complications: None    Operative Procedure: The patient was positioned supine on the table and MAC anaesthesia was achieved. The left arm was then prepped and draped in the usual fashion. A timeout was performed identifying the patient, operative site and procedure being performed. The left hand was inspected and there was noted to be a 2 cm laceration over the middle finger MCP joint. A 1.5 cm incision was made perpendicular to both ends of the laceration creating a Z type incision.   Tenotomy scissors were used to help raise flaps. There was noted to be a small amount of yellowish fluid. Cultures were then obtained. It was noted the extensor tendon was lacerated. The wound was then thoroughly irrigated with 1L of normal saline using a bulb syringe. The wound was then loosely closed with 3-0 nylon sutures. A sterile dressing was then applied. There were no complications and the patient tolerated the procedure well. The patient was taken to the recovery room in stable condition. Post op: The patient will receive IV abx while he is in the hospital.  He will then go home on oral abx based on infectious disease recommendations. He will follow up with my partner Dr. Latesha Elliott who will repair his extensor tendon once the infection has resolved.     Venita Velarde MD

## 2022-09-28 NOTE — PROGRESS NOTES
[address] [place] [address] [facility]  Progress Note    SUBJECTIVE:  Chief Complaint   Patient presents with    Hand Injury     Left hand injury Friday. Sutured at urgent care. Taking abx. Hand red and swollen     Patient is tolerating medications. No nausea, vomiting, diarrhea. Laying in bed, watching TV  Has some pain to L hand  For the OR today, no other complaints at this time    Review of systems:  As stated above in the chief complaint, otherwise negative. Medications:  Scheduled Meds:   vancomycin  1,500 mg IntraVENous Q12H    atorvastatin  20 mg Oral Daily    sodium chloride flush  5-40 mL IntraVENous 2 times per day    FLUoxetine  20 mg Oral Daily     Continuous Infusions:   sodium chloride       PRN Meds:sodium chloride flush, sodium chloride, ondansetron **OR** ondansetron, polyethylene glycol, acetaminophen **OR** acetaminophen, ketorolac    OBJECTIVE:  /73   Pulse 67   Temp 97.6 °F (36.4 °C) (Oral)   Resp 16   Ht 6' (1.829 m)   Wt 275 lb (124.7 kg)   SpO2 95%   BMI 37.30 kg/m²   Temp  Av.8 °F (36.6 °C)  Min: 97.6 °F (36.4 °C)  Max: 98 °F (36.7 °C)  Constitutional: The patient is awake, alert, and oriented. Laying in bed. Skin: Warm and dry. No rashes were noted. HEENT: Round and reactive pupils. Moist mucous membranes. No ulcerations or thrush. Neck: Supple to movements. Chest: No use of accessory muscles to breathe. Symmetrical expansion. No wheezing, crackles or rhonchi. Cardiovascular: S1 and S2 are rhythmic and regular. No murmurs appreciated. Abdomen: Positive bowel sounds to auscultation. Benign to palpation.    Extremities: Left hand swollen with wound with significant cellulitis and purulent drainage  Lines: peripheral    Laboratory and Tests Review:  Lab Results   Component Value Date    WBC 8.9 2022    WBC 10.4 2022    HGB 13.7 2022    HCT 41.7 2022    MCV 91.0 2022     2022     Lab Results Component Value Date    NEUTROABS 6.30 09/26/2022    NEUTROABS 8.29 (H) 09/25/2022     No results found for: CRPHS  No results found for: ALT, AST, GGT, ALKPHOS, BILITOT  Lab Results   Component Value Date/Time     09/26/2022 03:00 AM    K 3.8 09/26/2022 03:00 AM     09/26/2022 03:00 AM    CO2 22 09/26/2022 03:00 AM    BUN 16 09/26/2022 03:00 AM    CREATININE 0.9 09/26/2022 03:00 AM    CREATININE 0.9 09/25/2022 04:27 PM    GFRAA >60 09/26/2022 03:00 AM    LABGLOM >60 09/26/2022 03:00 AM    GLUCOSE 99 09/26/2022 03:00 AM    CALCIUM 9.0 09/26/2022 03:00 AM     No results found for: CRP  No results found for: [address]  Radiology:      Microbiology:   Blood Cultures 9/25/22: negative so far  Wound Culture 9/26/22: negative so far  Wound Culture 9/27/22: pending     ASSESSMENT:  Left hand abscess with open wound: Trauma related    PLAN:  Continue IV Vancomycin (PTD)  Ortho following: for Left hand I&D today, planning for tendon repair at a later date  Repeat wound culture pending   Check final cultures  Monitor labs    LASHON Kline CNP  11:52 AM  9/28/2022

## 2022-09-28 NOTE — PROGRESS NOTES
[facility] Quality Flow/Interdisciplinary Rounds Progress Note        Quality Flow Rounds held on September 28, 2022    Disciplines Attending:  Bedside Nurse, , , and Nursing Unit Leadership    Lorenzo Mcmahan was admitted on 9/25/2022  7:14 PM    Anticipated Discharge Date:       Disposition:    Hung Score:  Hung Scale Score: 22    Readmission Risk              Risk of Unplanned Readmission:  5           Discussed patient goal for the day, patient clinical progression, and barriers to discharge.   The following Goal(s) of the Day/Commitment(s) have been identified:  Diagnostics - Report Results and Labs - Report Results      Bryce Jimenez RN  September 28, 2022

## 2022-09-29 LAB
ALBUMIN SERPL-MCNC: 3.8 G/DL (ref 3.5–5.2)
ALP BLD-CCNC: 93 U/L (ref 40–129)
ALT SERPL-CCNC: 33 U/L (ref 0–40)
ANION GAP SERPL CALCULATED.3IONS-SCNC: 9 MMOL/L (ref 7–16)
AST SERPL-CCNC: 29 U/L (ref 0–39)
BASOPHILS ABSOLUTE: 0.03 E9/L (ref 0–0.2)
BASOPHILS RELATIVE PERCENT: 0.5 % (ref 0–2)
BILIRUB SERPL-MCNC: 0.4 MG/DL (ref 0–1.2)
BUN BLDV-MCNC: 14 MG/DL (ref 6–20)
CALCIUM SERPL-MCNC: 9.2 MG/DL (ref 8.6–10.2)
CHLORIDE BLD-SCNC: 104 MMOL/L (ref 98–107)
CO2: 24 MMOL/L (ref 22–29)
CREAT SERPL-MCNC: 0.8 MG/DL (ref 0.7–1.2)
EOSINOPHILS ABSOLUTE: 0.26 E9/L (ref 0.05–0.5)
EOSINOPHILS RELATIVE PERCENT: 4.1 % (ref 0–6)
GFR AFRICAN AMERICAN: >60
GFR NON-AFRICAN AMERICAN: >60 ML/MIN/1.73
GLUCOSE BLD-MCNC: 83 MG/DL (ref 74–99)
HCT VFR BLD CALC: 42.6 % (ref 37–54)
HEMOGLOBIN: 14.1 G/DL (ref 12.5–16.5)
IMMATURE GRANULOCYTES #: 0.02 E9/L
IMMATURE GRANULOCYTES %: 0.3 % (ref 0–5)
LYMPHOCYTES ABSOLUTE: 1.22 E9/L (ref 1.5–4)
LYMPHOCYTES RELATIVE PERCENT: 19.1 % (ref 20–42)
MCH RBC QN AUTO: 30.2 PG (ref 26–35)
MCHC RBC AUTO-ENTMCNC: 33.1 % (ref 32–34.5)
MCV RBC AUTO: 91.2 FL (ref 80–99.9)
MONOCYTES ABSOLUTE: 0.46 E9/L (ref 0.1–0.95)
MONOCYTES RELATIVE PERCENT: 7.2 % (ref 2–12)
NEUTROPHILS ABSOLUTE: 4.39 E9/L (ref 1.8–7.3)
NEUTROPHILS RELATIVE PERCENT: 68.8 % (ref 43–80)
PDW BLD-RTO: 13 FL (ref 11.5–15)
PLATELET # BLD: 206 E9/L (ref 130–450)
PMV BLD AUTO: 10.7 FL (ref 7–12)
POTASSIUM SERPL-SCNC: 4.4 MMOL/L (ref 3.5–5)
RBC # BLD: 4.67 E12/L (ref 3.8–5.8)
SODIUM BLD-SCNC: 137 MMOL/L (ref 132–146)
TOTAL PROTEIN: 7.1 G/DL (ref 6.4–8.3)
VANCOMYCIN RANDOM: 9.8 MCG/ML (ref 5–40)
WBC # BLD: 6.4 E9/L (ref 4.5–11.5)

## 2022-09-29 PROCEDURE — 1200000000 HC SEMI PRIVATE

## 2022-09-29 PROCEDURE — 2580000003 HC RX 258: Performed by: ORTHOPAEDIC SURGERY

## 2022-09-29 PROCEDURE — 6360000002 HC RX W HCPCS: Performed by: ORTHOPAEDIC SURGERY

## 2022-09-29 PROCEDURE — 6370000000 HC RX 637 (ALT 250 FOR IP): Performed by: ORTHOPAEDIC SURGERY

## 2022-09-29 PROCEDURE — 99232 SBSQ HOSP IP/OBS MODERATE 35: CPT | Performed by: INTERNAL MEDICINE

## 2022-09-29 PROCEDURE — 80202 ASSAY OF VANCOMYCIN: CPT

## 2022-09-29 PROCEDURE — 80053 COMPREHEN METABOLIC PANEL: CPT

## 2022-09-29 PROCEDURE — 36415 COLL VENOUS BLD VENIPUNCTURE: CPT

## 2022-09-29 PROCEDURE — 2580000003 HC RX 258: Performed by: NURSE PRACTITIONER

## 2022-09-29 PROCEDURE — 85025 COMPLETE CBC W/AUTO DIFF WBC: CPT

## 2022-09-29 RX ORDER — SODIUM CHLORIDE 0.9 % (FLUSH) 0.9 %
5-40 SYRINGE (ML) INJECTION PRN
Status: DISCONTINUED | OUTPATIENT
Start: 2022-09-29 | End: 2022-10-03 | Stop reason: HOSPADM

## 2022-09-29 RX ORDER — OXYCODONE HYDROCHLORIDE AND ACETAMINOPHEN 5; 325 MG/1; MG/1
1 TABLET ORAL EVERY 6 HOURS PRN
Qty: 28 TABLET | Refills: 0 | Status: SHIPPED | OUTPATIENT
Start: 2022-09-29 | End: 2022-10-06

## 2022-09-29 RX ORDER — SODIUM CHLORIDE 9 MG/ML
25 INJECTION, SOLUTION INTRAVENOUS PRN
Status: DISCONTINUED | OUTPATIENT
Start: 2022-09-29 | End: 2022-10-03 | Stop reason: HOSPADM

## 2022-09-29 RX ORDER — SODIUM CHLORIDE 0.9 % (FLUSH) 0.9 %
5-40 SYRINGE (ML) INJECTION EVERY 12 HOURS SCHEDULED
Status: DISCONTINUED | OUTPATIENT
Start: 2022-09-29 | End: 2022-10-03 | Stop reason: HOSPADM

## 2022-09-29 RX ORDER — LIDOCAINE HYDROCHLORIDE 10 MG/ML
5 INJECTION, SOLUTION EPIDURAL; INFILTRATION; INTRACAUDAL; PERINEURAL ONCE
Status: COMPLETED | OUTPATIENT
Start: 2022-09-29 | End: 2022-10-01

## 2022-09-29 RX ADMIN — VANCOMYCIN HYDROCHLORIDE 1500 MG: 10 INJECTION, POWDER, LYOPHILIZED, FOR SOLUTION INTRAVENOUS at 06:29

## 2022-09-29 RX ADMIN — ATORVASTATIN CALCIUM 20 MG: 20 TABLET, FILM COATED ORAL at 08:56

## 2022-09-29 RX ADMIN — FLUOXETINE HYDROCHLORIDE 20 MG: 20 CAPSULE ORAL at 08:56

## 2022-09-29 RX ADMIN — KETOROLAC TROMETHAMINE 30 MG: 30 INJECTION, SOLUTION INTRAMUSCULAR; INTRAVENOUS at 18:48

## 2022-09-29 RX ADMIN — SODIUM CHLORIDE, PRESERVATIVE FREE 10 ML: 5 INJECTION INTRAVENOUS at 20:24

## 2022-09-29 RX ADMIN — KETOROLAC TROMETHAMINE 30 MG: 30 INJECTION, SOLUTION INTRAMUSCULAR; INTRAVENOUS at 06:32

## 2022-09-29 RX ADMIN — VANCOMYCIN HYDROCHLORIDE 1500 MG: 10 INJECTION, POWDER, LYOPHILIZED, FOR SOLUTION INTRAVENOUS at 18:36

## 2022-09-29 RX ADMIN — SODIUM CHLORIDE, PRESERVATIVE FREE 10 ML: 5 INJECTION INTRAVENOUS at 08:55

## 2022-09-29 ASSESSMENT — PAIN - FUNCTIONAL ASSESSMENT
PAIN_FUNCTIONAL_ASSESSMENT: ACTIVITIES ARE NOT PREVENTED
PAIN_FUNCTIONAL_ASSESSMENT: PREVENTS OR INTERFERES SOME ACTIVE ACTIVITIES AND ADLS

## 2022-09-29 ASSESSMENT — PAIN SCALES - GENERAL
PAINLEVEL_OUTOF10: 3
PAINLEVEL_OUTOF10: 1
PAINLEVEL_OUTOF10: 2
PAINLEVEL_OUTOF10: 2
PAINLEVEL_OUTOF10: 1
PAINLEVEL_OUTOF10: 4
PAINLEVEL_OUTOF10: 2
PAINLEVEL_OUTOF10: 4

## 2022-09-29 ASSESSMENT — PAIN DESCRIPTION - LOCATION
LOCATION: HAND

## 2022-09-29 ASSESSMENT — PAIN DESCRIPTION - ORIENTATION
ORIENTATION: LEFT

## 2022-09-29 ASSESSMENT — PAIN DESCRIPTION - DESCRIPTORS
DESCRIPTORS: THROBBING

## 2022-09-29 NOTE — PROGRESS NOTES
[facility]   Progress Note    Admitting Date and Time: 9/25/2022  7:14 PM  Admit Dx: Cellulitis [L03.90]  Injury of left hand, initial encounter [S69.92XA]  Cellulitis of other specified site [L03.818]    Subjective: Patient came to ED on 25th with left hand pain and swelling. Injured while working on car and did have stitches taken  at urgent care. Patient with mild tachycardia on presentation but nontoxic. Admitted with cellulitis. Patient with hyperlipidemia and no fracture or foreign body noted on x-rays in ED. unable to get orthopedic input, patient with worsening local examination, evaluated by ID, concern of deep tissue infection, patient started on vancomycin and cefazolin stopped. Did recommend debridement with orthopedics. Patient seen by orthopedics, impression of left middle finger extensor tendon laceration. Patient will need incision and drainage as well as tendon repair at a later date. Patient did undergo irrigation and non excisional debridement left middle finger. Extensor tendon was noted lacerated. Patient okay for DC from orthopedic side. Patient was admitted with Cellulitis [L03.90]  Injury of left hand, initial encounter [S69.92XA]  Cellulitis of other specified site [L03.818]. Patient is awake, alert, sitting by window, sitting, feels much better, does communicate well, denies any overnight issues. Per RN: No new complaints.     ROS: denies fever, chills, cp, sob, n/v, HA unless stated above.     sodium chloride flush  5-40 mL IntraVENous 2 times per day    vancomycin  1,500 mg IntraVENous Q12H    atorvastatin  20 mg Oral Daily    FLUoxetine  20 mg Oral Daily     sodium chloride flush, 5-40 mL, PRN  sodium chloride, , PRN  ondansetron, 4 mg, Q8H PRN   Or  ondansetron, 4 mg, Q6H PRN  polyethylene glycol, 17 g, Daily PRN  acetaminophen, 650 mg, Q6H PRN   Or  acetaminophen, 650 mg, Q6H PRN  ketorolac, 30 mg, Q6H PRN       Objective:    /70 Pulse 74   Temp 98.2 °F (36.8 °C) (Oral)   Resp 18   Ht 6' (1.829 m)   Wt 275 lb (124.7 kg)   SpO2 95%   BMI 37.30 kg/m²   General Appearance: alert and oriented to person, place and time, well-developed and well-nourished, in no acute distress  Skin: warm and dry, no rash or erythema  Head: normocephalic and atraumatic  Eyes: pupils equal, round, and reactive to light, extraocular eye movements intact, conjunctivae normal  ENT: tympanic membrane, external ear and ear canal normal bilaterally, oropharynx clear and moist with normal mucous membranes  Neck: neck supple and non tender without mass, no thyromegaly or thyroid nodules, no cervical lymphadenopathy   Pulmonary/Chest: clear to auscultation bilaterally- no wheezes, rales or rhonchi, normal air movement, no respiratory distress  Cardiovascular: normal rate, normal S1 and S2, no gallops, intact distal pulses, and no carotid bruits  Abdomen: soft, non-tender, non-distended, normal bowel sounds, no masses or organomegaly  Local examination, improving compared to yesterday. No results for input(s): NA, K, CL, CO2, BUN, CREATININE, GLUCOSE, CALCIUM in the last 72 hours. No results for input(s): WBC, RBC, HGB, HCT, MCV, MCH, MCHC, RDW, PLT, MPV in the last 72 hours. A1c 5.1  Blood cultures, no growth at 24 hours, final results pending  Wound cultures, pending. Radiology:   XR HAND LEFT (MIN 3 VIEWS)   Final Result   Marked soft tissue swelling without evidence of acute fracture or radiopaque   foreign body. Assessment:    Principal Problem:    Cellulitis  Active Problems:    Hyperlipidemia    Obesity (BMI 30-39. 9)    Hyperglycemia  Resolved Problems:    * No resolved hospital problems. *      Plan:  Left hand wound, patient was taken for I&D yesterday by orthopedics, patient improving well, patient okay for DC from orthopedics side, patient will need repair of lacerated tendon at a later date.   Infected wound, seen by infectious disease, antibiotics changed to vancomycin, today is day 3. Hyperlipidemia, stable on statins. Depression, patient is on his home dose of Prozac. DC planning, not ready yet, likely will depend upon when infectious disease makes final decision on antibiotics, so far cultures not back. Hopefully ready also.         Electronically signed by Ирина Fraire MD on 9/29/2022 at 9:03 AM

## 2022-09-29 NOTE — PLAN OF CARE
Problem: Pain  Goal: Verbalizes/displays adequate comfort level or baseline comfort level  9/28/2022 2233 by Elisabeth Mejia  Outcome: Progressing  9/28/2022 0927 by Mark Benítez RN  Outcome: Progressing     Problem: Discharge Planning  Goal: Discharge to home or other facility with appropriate resources  Outcome: Progressing

## 2022-09-29 NOTE — PROGRESS NOTES
Orthopaedic Surgery Progress Note  K. Amelie Lennox, MD      SUBJECTIVE:  No acute events over night. Pain controlled. Denies chest pain or shortness of breath. OBJECTIVE:   AAOx3, NAD    Left upper extremity    Dressings C/D/I  SILT L1-S1  TA/EHL/GS intact  Calf soft, NT  +2 DP, W/WP     Data:  CBC:   Lab Results   Component Value Date/Time    WBC 8.9 09/26/2022 03:00 AM    RBC 4.58 09/26/2022 03:00 AM    HGB 13.7 09/26/2022 03:00 AM    HCT 41.7 09/26/2022 03:00 AM    MCV 91.0 09/26/2022 03:00 AM    MCH 29.9 09/26/2022 03:00 AM    MCHC 32.9 09/26/2022 03:00 AM    RDW 13.2 09/26/2022 03:00 AM     09/26/2022 03:00 AM    MPV 11.6 09/26/2022 03:00 AM     BMP:    Lab Results   Component Value Date/Time     09/26/2022 03:00 AM    K 3.8 09/26/2022 03:00 AM     09/26/2022 03:00 AM    CO2 22 09/26/2022 03:00 AM    BUN 16 09/26/2022 03:00 AM    CREATININE 0.9 09/26/2022 03:00 AM    CALCIUM 9.0 09/26/2022 03:00 AM    GFRAA >60 09/26/2022 03:00 AM    LABGLOM >60 09/26/2022 03:00 AM    GLUCOSE 99 09/26/2022 03:00 AM         A/P: POD 1 I&D left hand, extensor tendon laceration  - Abx per ID  - F/U cultures  - Keep dressing Clean and dry  - Pain control  - D/C planning for home. Ok to discharge from ortho standpoint  - Follow up with Dr. Toni Thomas in 1 week. He will require repair of his extensor tendon in the near future    K. Amelie Lennox, MD

## 2022-09-29 NOTE — PROGRESS NOTES
[address] [place] [address] [facility]  Progress Note      Chief Complaint   Patient presents with    Hand Injury     Left hand injury Friday. Sutured at urgent care. Taking abx. Hand red and swollen       SUBJECTIVE:  Patient is tolerating medications. No reported adverse drug reactions. No nausea, vomiting, diarrhea. Pain improved. Swelling the same. Review of systems:  As stated above in the chief complaint, otherwise negative. Medications:  Scheduled Meds:   sodium chloride flush  5-40 mL IntraVENous 2 times per day    vancomycin  1,500 mg IntraVENous Q12H    atorvastatin  20 mg Oral Daily    FLUoxetine  20 mg Oral Daily     Continuous Infusions:   sodium chloride       PRN Meds:sodium chloride flush, sodium chloride, ondansetron **OR** ondansetron, polyethylene glycol, acetaminophen **OR** acetaminophen, ketorolac    OBJECTIVE:  /70   Pulse 74   Temp 98.2 °F (36.8 °C) (Oral)   Resp 18   Ht 6' (1.829 m)   Wt 275 lb (124.7 kg)   SpO2 95%   BMI 37.30 kg/m²   Temp  Av.6 °F (36.4 °C)  Min: 97 °F (36.1 °C)  Max: 98.4 °F (36.9 °C)  Constitutional: The patient is awake, alert, and oriented. Skin: Warm and dry. No rashes were noted. HEENT: Round and reactive pupils. Moist mucous membranes. No ulcerations or thrush. Neck: Supple to movements. Chest: No use of accessory muscles to breathe. Symmetrical expansion. No wheezing, crackles or rhonchi. Cardiovascular: S1 and S2 are rhythmic and regular. No murmurs appreciated. Abdomen: Positive bowel sounds to auscultation. Benign to palpation. No masses felt. No hepatosplenomegaly. Genitourinary: male  Extremities: No clubbing, no cyanosis, no edema.  Left hand dressed, swelling good rom fingers  Lines: peripheral    Laboratory and Tests Review:  Lab Results   Component Value Date    WBC 8.9 2022    WBC 10.4 2022    HGB 13.7 2022    HCT 41.7 2022    MCV 91.0 2022     2022     Lab Results   Component Value Date    NEUTROABS 6.30 09/26/2022    NEUTROABS 8.29 (H) 09/25/2022     No results found for: CRPHS  No results found for: ALT, AST, GGT, ALKPHOS, BILITOT  Lab Results   Component Value Date/Time     09/26/2022 03:00 AM    K 3.8 09/26/2022 03:00 AM     09/26/2022 03:00 AM    CO2 22 09/26/2022 03:00 AM    BUN 16 09/26/2022 03:00 AM    CREATININE 0.9 09/26/2022 03:00 AM    CREATININE 0.9 09/25/2022 04:27 PM    GFRAA >60 09/26/2022 03:00 AM    LABGLOM >60 09/26/2022 03:00 AM    GLUCOSE 99 09/26/2022 03:00 AM    CALCIUM 9.0 09/26/2022 03:00 AM     No results found for: CRP  No results found for: [address]  Radiology:      Microbiology:   Lab Results   Component Value Date/Time    BC 24 Hours no growth 09/25/2022 10:00 PM     Lab Results   Component Value Date/Time    BLOODCULT2 24 Hours no growth 09/25/2022 10:00 PM     WOUND/ABSCESS   Date Value Ref Range Status   09/26/2022 Growth not present  Final     No results found for: RESPSMEAR  No results found for: MPNEUMO, CLAMYDCU, LABLEGI, AFBCX, FUNGSM, LABFUNG  No results found for: CULTRESP  No results found for: CXCATHTIP  No results found for: BFCS  No results found for: CXSURG  No results found for: Leon Aspen  No results found for: [facility]   9/25/22 blood cx ngd  9/26/22 wound cx now growth  9/28/22 surgical cx pending    Assessment:  Deep left hand abscess with open wound: Trauma related  9/28/22 S/p  Irrigation and non excisional debridement left middle finger-small amount of yellowish fluid, extensor tendon was lacerated. Plan:    Continue IV Vancomycin (PTD)  Repeat wound cx sent-pending  Follow surgical cultures-pending  Place picc-plan for IV antibiotic for 3-4 weeks  Will follow with you       Electronically signed by LASHON Joaquin CNP on 9/29/2022 at 8:23 AM     Pt seen and examined. Above discussed agree with advanced practice nurse. Labs, cultures, and radiographs reviewed. Face to Face encounter occurred. Changes made as necessary.      Ning Garcia MD

## 2022-09-29 NOTE — CARE COORDINATION
Met with patient at bedside. He communicated that he completed a First Report of Injury (Lornaene Saima) on line with [facility] [place] [facility] last PM.  Call to [facility] [place] [facility], [phone], spoke with Barb Arriaga. She confirmed receipt of Theron Landaverde but states it has not yet been processed nor has a case number been established. She did advise that demographic and clinical information can still be faxed to [phone]. Same completed including demo's, h&p, ID and ortho consults. Instructed patient that he may need to utilize his traditional medical coverage for services until [facility] claim is established/approved. He was agreeable to same. Per ortho, dressing is to remain intact. Await ID plans for antibiotic coverage. If po, would have no acute discharge planning needs. If IV, a referral was initiated with [address] [place] [address], [place] 9/28/22. Will follow along with  and assist with discharge planning as necessary. Onofre Ochoa.  Micah, MSN RN  [facility] Case Management  [phone]

## 2022-09-29 NOTE — PATIENT CARE CONFERENCE
P Quality Flow/Interdisciplinary Rounds Progress Note        Quality Flow Rounds held on September 29, 2022    Disciplines Attending:  Bedside Nurse, , , and Nursing Unit Leadership    Fahad Israel was admitted on 9/25/2022  7:14 PM    Anticipated Discharge Date:       Disposition:    Hung Score:  Hung Scale Score: 22    Readmission Risk              Risk of Unplanned Readmission:  5           Discussed patient goal for the day, patient clinical progression, and barriers to discharge.   The following Goal(s) of the Day/Commitment(s) have been identified:  Diagnostics - Report Results      Edmundo De Oliveira RN  September 29, 2022

## 2022-09-30 LAB
GRAM STAIN ORDERABLE: NORMAL
ORGANISM: ABNORMAL
WOUND/ABSCESS: ABNORMAL

## 2022-09-30 PROCEDURE — 2580000003 HC RX 258: Performed by: NURSE PRACTITIONER

## 2022-09-30 PROCEDURE — 99232 SBSQ HOSP IP/OBS MODERATE 35: CPT | Performed by: STUDENT IN AN ORGANIZED HEALTH CARE EDUCATION/TRAINING PROGRAM

## 2022-09-30 PROCEDURE — 6360000002 HC RX W HCPCS

## 2022-09-30 PROCEDURE — 6360000002 HC RX W HCPCS: Performed by: STUDENT IN AN ORGANIZED HEALTH CARE EDUCATION/TRAINING PROGRAM

## 2022-09-30 PROCEDURE — 1200000000 HC SEMI PRIVATE

## 2022-09-30 PROCEDURE — 6360000002 HC RX W HCPCS: Performed by: ORTHOPAEDIC SURGERY

## 2022-09-30 PROCEDURE — 6370000000 HC RX 637 (ALT 250 FOR IP): Performed by: ORTHOPAEDIC SURGERY

## 2022-09-30 PROCEDURE — 2580000003 HC RX 258

## 2022-09-30 RX ORDER — ENOXAPARIN SODIUM 100 MG/ML
40 INJECTION SUBCUTANEOUS DAILY
Status: DISCONTINUED | OUTPATIENT
Start: 2022-09-30 | End: 2022-10-03 | Stop reason: DRUGHIGH

## 2022-09-30 RX ADMIN — VANCOMYCIN HYDROCHLORIDE 1500 MG: 10 INJECTION, POWDER, LYOPHILIZED, FOR SOLUTION INTRAVENOUS at 06:37

## 2022-09-30 RX ADMIN — WATER 2000 MG: 1 INJECTION INTRAMUSCULAR; INTRAVENOUS; SUBCUTANEOUS at 17:15

## 2022-09-30 RX ADMIN — SODIUM CHLORIDE, PRESERVATIVE FREE 10 ML: 5 INJECTION INTRAVENOUS at 17:15

## 2022-09-30 RX ADMIN — KETOROLAC TROMETHAMINE 30 MG: 30 INJECTION, SOLUTION INTRAMUSCULAR; INTRAVENOUS at 06:38

## 2022-09-30 RX ADMIN — ENOXAPARIN SODIUM 40 MG: 100 INJECTION SUBCUTANEOUS at 16:06

## 2022-09-30 RX ADMIN — ATORVASTATIN CALCIUM 20 MG: 20 TABLET, FILM COATED ORAL at 09:03

## 2022-09-30 RX ADMIN — SODIUM CHLORIDE, PRESERVATIVE FREE 10 ML: 5 INJECTION INTRAVENOUS at 09:02

## 2022-09-30 RX ADMIN — SODIUM CHLORIDE, PRESERVATIVE FREE 10 ML: 5 INJECTION INTRAVENOUS at 20:39

## 2022-09-30 RX ADMIN — FLUOXETINE HYDROCHLORIDE 20 MG: 20 CAPSULE ORAL at 09:03

## 2022-09-30 ASSESSMENT — PAIN SCALES - GENERAL
PAINLEVEL_OUTOF10: 4
PAINLEVEL_OUTOF10: 0
PAINLEVEL_OUTOF10: 2

## 2022-09-30 ASSESSMENT — PAIN DESCRIPTION - DESCRIPTORS: DESCRIPTORS: THROBBING

## 2022-09-30 ASSESSMENT — PAIN - FUNCTIONAL ASSESSMENT: PAIN_FUNCTIONAL_ASSESSMENT: PREVENTS OR INTERFERES SOME ACTIVE ACTIVITIES AND ADLS

## 2022-09-30 ASSESSMENT — PAIN DESCRIPTION - ORIENTATION: ORIENTATION: LEFT

## 2022-09-30 ASSESSMENT — PAIN DESCRIPTION - LOCATION: LOCATION: HAND

## 2022-09-30 NOTE — PROGRESS NOTES
Comprehensive Nutrition Assessment    Type and Reason for Visit:  Initial, RD Nutrition Re-Screen/LOS    Nutrition Recommendations/Plan:   Continue current diet  Will add Gelatein BID     Nutrition Assessment:    Pt w/ L hand wound/infection s/p I&D. Pt consuming >75% of meals, will add ONS to optimize nutritent intake in the setting of wound & monitor. Nutrition Related Findings:    A&O, +I&Os, LUE +1 edema, abd WDL Wound Type: Surgical Incision       Current Nutrition Intake & Therapies:    Average Meal Intake: %  Average Supplements Intake: None Ordered  ADULT DIET; Regular    Anthropometric Measures:  Height: 6' (182.9 cm)  Ideal Body Weight (IBW): 178 lbs (81 kg)    Admission Body Weight: 278 lb (126.1 kg) (9/26 bed)  Current Body Weight: 284 lb (128.8 kg), 159.6 % IBW.  Weight Source: Bed Scale (9/30 elevated (+I&Os/edema))  Current BMI (kg/m2): 38.5  Usual Body Weight:  (no actual wt hx per EMR)     Weight Adjustment For: No Adjustment                 BMI Categories: Obese Class 2 (BMI 35.0 -39.9)    Estimated Daily Nutrient Needs:  Energy Requirements Based On: Kcal/kg  Weight Used for Energy Requirements: Admission  Energy (kcal/day):   Weight Used for Protein Requirements: Ideal  Protein (g/day): 105-120 (1.3-1.5)  Method Used for Fluid Requirements: 1 ml/kcal  Fluid (ml/day):     Nutrition Diagnosis:   Increased nutrient needs related to increase demand for energy/nutrients as evidenced by wounds    Nutrition Interventions:   Food and/or Nutrient Delivery: Continue Current Diet, Start Oral Nutrition Supplement  Nutrition Education/Counseling: No recommendation at this time  Coordination of Nutrition Care: Continue to monitor while inpatient       Goals:     Goals: PO intake 75% or greater       Nutrition Monitoring and Evaluation:      Food/Nutrient Intake Outcomes: Food and Nutrient Intake, Supplement Intake  Physical Signs/Symptoms Outcomes: Biochemical Data, GI Status, Fluid Status or Edema, Nutrition Focused Physical Findings, Skin, Weight    Discharge Planning:     Too soon to determine     John Dias RD, LD  Contact: [phone]

## 2022-09-30 NOTE — PROGRESS NOTES
Pharmacy Consultation Note  (Antibiotic Dosing and Monitoring)    Initial consult date: 9/27/2022  Consulting physician/provider: Dr. Werner Hatfield  Drug: Vancomycin  Indication: SSTI    Age/  Gender Height Weight IBW  Allergy Information   47 y.o./male 6' (182.9 cm) 275 lb (124.7 kg)     Ideal body weight: 77.6 kg (171 lb 1.2 oz)  Adjusted ideal body weight: 98.1 kg (216 lb 3.9 oz)   Patient has no known allergies. Renal Function:  Recent Labs     09/29/22  0924   BUN 14   CREATININE 0.8         Intake/Output Summary (Last 24 hours) at 9/30/2022 1422  Last data filed at 9/29/2022 1854  Gross per 24 hour   Intake 240 ml   Output --   Net 240 ml         Vancomycin Monitoring:  Trough:  No results for input(s): VANCOTROUGH in the last 72 hours. Random:    Recent Labs     09/29/22  0605   VANCORANDOM 9.8         No results for input(s): Adeel Cox in the last 72 hours. Historical Cultures:  Organism   Date Value Ref Range Status   09/28/2022 Staphylococcus aureus (A)  Preliminary     No results for input(s): BC in the last 72 hours. Vancomycin Administration Times:  Recent vancomycin administrations                     vancomycin 1500 mg in dextrose 5% 300 mL IVPB (mg) 1,500 mg New Bag 09/30/22 0637     1,500 mg New Bag 09/29/22 1836     1,500 mg New Bag  0629     1,500 mg New Bag 09/28/22 1846     1,500 mg New Bag  0635    vancomycin (VANCOCIN) 2,500 mg in dextrose 5 % 500 mL IVPB (mg) 2,500 mg New Bag 09/27/22 1836                    Assessment:  Patient is a 41 y.o. male who has been initiated on vancomycin  Estimated Creatinine Clearance: 162 mL/min (based on SCr of 0.8 mg/dL). To dose vancomycin, pharmacy will be utilizing IGG calculation software for goal AUC/LIDIA 400-600 mg/L-hr    Plan:   Will continue vancomycin 1500 mg IV every 12 hours  Will check vancomycin levels when appropriate  Will continue to monitor renal function   Pharmacy to follow    Edyta Flores PharmD, BCPS 9/30/2022 2:22 PM   Ext: [phone]

## 2022-09-30 NOTE — PROGRESS NOTES
[address] [place] [address] [facility]  Progress Note      Chief Complaint   Patient presents with    Hand Injury     Left hand injury Friday. Sutured at urgent care. Taking abx. Hand red and swollen       SUBJECTIVE:  Patient is tolerating medications. No reported adverse drug reactions. No nausea, vomiting, diarrhea. Laying in bed, wife at bedside  Left hand was recently wrapped, pain is tolerable  Afebrile     Review of systems:  As stated above in the chief complaint, otherwise negative. Medications:  Scheduled Meds:   lidocaine 1 % injection  5 mL IntraDERmal Once    sodium chloride flush  5-40 mL IntraVENous 2 times per day    vancomycin  1,500 mg IntraVENous Q12H    atorvastatin  20 mg Oral Daily    FLUoxetine  20 mg Oral Daily     Continuous Infusions:   sodium chloride       PRN Meds:sodium chloride flush, sodium chloride, ondansetron **OR** ondansetron, polyethylene glycol, acetaminophen **OR** acetaminophen, ketorolac    OBJECTIVE:  /74   Pulse 74   Temp 98.1 °F (36.7 °C) (Oral)   Resp 18   Ht 6' (1.829 m)   Wt 284 lb (128.8 kg)   SpO2 96%   BMI 38.52 kg/m²   Temp  Av.3 °F (36.8 °C)  Min: 98.1 °F (36.7 °C)  Max: 98.8 °F (37.1 °C)  Constitutional: The patient is awake, alert, and oriented. Laying in bed. Wife at bedside. Skin: Warm and dry. No rashes were noted. HEENT: Round and reactive pupils. Moist mucous membranes. No ulcerations or thrush. Neck: Supple to movements. Chest: No use of accessory muscles to breathe. Symmetrical expansion. No wheezing, crackles or rhonchi. Cardiovascular: S1 and S2 are rhythmic and regular. No murmurs appreciated. Abdomen: Positive bowel sounds to auscultation. Benign to palpation. Genitourinary: male  Extremities: No clubbing, no cyanosis, no edema.  Left hand dressed, swelling good rom fingers  Lines: peripheral    Laboratory and Tests Review:  Lab Results   Component Value Date    WBC 6.4 2022    WBC 8.9 09/26/2022    WBC 10.4 09/25/2022    HGB 14.1 09/29/2022    HCT 42.6 09/29/2022    MCV 91.2 09/29/2022     09/29/2022     Lab Results   Component Value Date    NEUTROABS 4.39 09/29/2022    NEUTROABS 6.30 09/26/2022    NEUTROABS 8.29 (H) 09/25/2022     No results found for: CRPHS  Lab Results   Component Value Date    ALT 33 09/29/2022    AST 29 09/29/2022    ALKPHOS 93 09/29/2022    BILITOT 0.4 09/29/2022     Lab Results   Component Value Date/Time     09/29/2022 09:24 AM    K 4.4 09/29/2022 09:24 AM    K 3.8 09/26/2022 03:00 AM     09/29/2022 09:24 AM    CO2 24 09/29/2022 09:24 AM    BUN 14 09/29/2022 09:24 AM    CREATININE 0.8 09/29/2022 09:24 AM    CREATININE 0.9 09/26/2022 03:00 AM    CREATININE 0.9 09/25/2022 04:27 PM    GFRAA >60 09/29/2022 09:24 AM    LABGLOM >60 09/29/2022 09:24 AM    GLUCOSE 83 09/29/2022 09:24 AM    PROT 7.1 09/29/2022 09:24 AM    LABALBU 3.8 09/29/2022 09:24 AM    CALCIUM 9.2 09/29/2022 09:24 AM    BILITOT 0.4 09/29/2022 09:24 AM    ALKPHOS 93 09/29/2022 09:24 AM    AST 29 09/29/2022 09:24 AM    ALT 33 09/29/2022 09:24 AM     No results found for: CRP  No results found for: [facility]  Radiology:      Microbiology:   Wound Culture 9/27/22; MSSA  Surgical Cultures 9/28/22: M_SA  g/s: no organisms seen  Blood Cultures 9/25/22: negative so far    Assessment:  Deep left hand abscess with open wound: Trauma related  9/28/22 S/p  Irrigation and non excisional debridement left middle finger-small amount of yellowish fluid, extensor tendon was lacerated. Plan:    Stop IV Vancomycin (PTD) and start IV Ancef 2g q8   PICC line pending placement - planning for IV antibiotic for 3-4 weeks  Will follow with you     Electronically signed by LASHON Merino CNP on 9/30/2022 at 12:04 PM     Pt seen and examined. Above discussed agree with advanced practice nurse. Labs, cultures, and radiographs reviewed. Face to Face encounter occurred. Changes made as necessary.      Ruddy King Savana Ashton MD

## 2022-09-30 NOTE — PATIENT CARE CONFERENCE
P Quality Flow/Interdisciplinary Rounds Progress Note        Quality Flow Rounds held on September 30, 2022    Disciplines Attending:  Bedside Nurse, , and Nursing Unit Leadership    Alida Palacios was admitted on 9/25/2022  7:14 PM    Anticipated Discharge Date:       Disposition:    Hung Score:  Hung Scale Score: 22    Readmission Risk              Risk of Unplanned Readmission:  5           Discussed patient goal for the day, patient clinical progression, and barriers to discharge.   The following Goal(s) of the Day/Commitment(s) have been identified:  Diagnostics - Report Results      Cuong Salazar RN  September 30, 2022

## 2022-09-30 NOTE — CARE COORDINATION
Social Work discharge Bonnie Deleon continues to follow for discharge planning with CM. Pt's plan remains if po meds at discharge, no needs. IF IV ATB needed at discharge, plan is [facility] IF once a day and [facility] if twice or more times a day. Pt will need signed IV ATB RX, HHC order, and signed Rosaura Miguel [facility] agreement faxed to [phone] IF needed. Electronically signed by Jeffrey De La Cruz on 9/30/2022 at 11:13 AM     Addendum-    Noted PICC ordered yesterday by ID. SW gave pt [address] form to sign and faxed to [facility] now. IV Vanco is twice a day, so will need hhc if iv atb stays. Await final ID plan. If IV changes to daily, pt wants [facility] referral will need called to them [phone] or weekend [phone]. Pt and wife said c work better with his work schedule.   Electronically signed by Jeffrey De La Cruz on 9/30/2022 at 11:16 AM

## 2022-09-30 NOTE — HOME CARE
Kiran Fierro  Ordered therapy at time of quote: VANCO 1.5 GM IV Q12H  Deductible: $500  Coverage: 90%  Out-of-Pocket Max: $4000  Total pt responsibility (after deductible met): NO CLAIM IN [address] [place] [address] AT THIS TIME. PT RESPONSIBILITY THROUGH [employer] = $20.38 PER DAY       Patient has agree with cost. Patient will need home health orders, home iv script and pharmacy agreement faxed to [phone] or [phone] prior to discharge.  Thank you, [facility]

## 2022-09-30 NOTE — PLAN OF CARE
Problem: Pain  Goal: Verbalizes/displays adequate comfort level or baseline comfort level  9/29/2022 2253 by Debbi Barry  Outcome: Progressing  9/29/2022 1426 by Rena Van RN  Outcome: Progressing     Problem: Discharge Planning  Goal: Discharge to home or other facility with appropriate resources  9/29/2022 2253 by Debbi Barry  Outcome: Progressing  9/29/2022 1426 by Rena Van RN  Outcome: Progressing

## 2022-09-30 NOTE — PROGRESS NOTES
[facility] Progress Note    Admitting Date and Time: 9/25/2022  7:14 PM  Admit Dx: Cellulitis [L03.90]  Injury of left hand, initial encounter [S69.92XA]  Cellulitis of other specified site [L03.818]    Subjective:  Patient is being followed for Cellulitis [L03.90]  Injury of left hand, initial encounter [S69.92XA]  Cellulitis of other specified site [J35.867]   Pt feels pain left hand, well controlled with meds. Per RN: No major concerns. ROS: denies fever, chills, cp, sob, n/v, HA unless stated above.      lidocaine 1 % injection  5 mL IntraDERmal Once    sodium chloride flush  5-40 mL IntraVENous 2 times per day    vancomycin  1,500 mg IntraVENous Q12H    atorvastatin  20 mg Oral Daily    FLUoxetine  20 mg Oral Daily     sodium chloride flush, 5-40 mL, PRN  sodium chloride, 25 mL, PRN  ondansetron, 4 mg, Q8H PRN   Or  ondansetron, 4 mg, Q6H PRN  polyethylene glycol, 17 g, Daily PRN  acetaminophen, 650 mg, Q6H PRN   Or  acetaminophen, 650 mg, Q6H PRN  ketorolac, 30 mg, Q6H PRN         Objective:    /74   Pulse 74   Temp 98.1 °F (36.7 °C) (Oral)   Resp 18   Ht 6' (1.829 m)   Wt 284 lb (128.8 kg)   SpO2 96%   BMI 38.52 kg/m²     General Appearance: alert and oriented to person, place and time and in no acute distress  Skin: warm and dry  Head: normocephalic and atraumatic  Eyes: pupils equal, round, and reactive to light, extraocular eye movements intact, conjunctivae normal  Neck: neck supple and non tender without mass   Pulmonary/Chest: clear to auscultation bilaterally- no wheezes, rales or rhonchi, normal air movement, no respiratory distress  Cardiovascular: normal rate, normal S1 and S2 and no carotid bruits  Abdomen: soft, non-tender, non-distended, normal bowel sounds, no masses or organomegaly  Extremities: no cyanosis, no clubbing and no edema, left hand erythema, selling, C/D/I dressings.   Neurologic: no cranial nerve deficit and speech normal        Recent Labs 09/29/22  0924      K 4.4      CO2 24   BUN 14   CREATININE 0.8   GLUCOSE 83   CALCIUM 9.2       Recent Labs     09/29/22  0924   WBC 6.4   RBC 4.67   HGB 14.1   HCT 42.6   MCV 91.2   MCH 30.2   MCHC 33.1   RDW 13.0      MPV 10.7       Micro:  No components found for: [facility])    Radiology:   No results found. Assessment:    Principal Problem:    Cellulitis  Active Problems:    Hyperlipidemia    Obesity (BMI 30-39. 9)    Hyperglycemia  Resolved Problems:    * No resolved hospital problems. *      Plan:  Left hand  abscess - traumatic extensor tendon laceration -  I&D- 9/29 per orthopedics, patient improving well, patient okay for DC from orthopedics side, patient will need repair of lacerated tendon at a later date. ID on board, pending PICC, on Vanc. Hyperlipidemia, stable on statins. Depression, on Prozac. DVT Prophylaxis - Lovenox    NOTE: This report was transcribed using voice recognition software. Every effort was made to ensure accuracy; however, inadvertent computerized transcription errors may be present.   Electronically signed by Mackenzie Mcfarlane MD on 9/30/2022 at 9:26 AM

## 2022-10-01 LAB
ANAEROBIC CULTURE: NORMAL
ANION GAP SERPL CALCULATED.3IONS-SCNC: 11 MMOL/L (ref 7–16)
BASOPHILS ABSOLUTE: 0.04 E9/L (ref 0–0.2)
BASOPHILS RELATIVE PERCENT: 0.6 % (ref 0–2)
BLOOD CULTURE, ROUTINE: NORMAL
BUN BLDV-MCNC: 13 MG/DL (ref 6–20)
CALCIUM SERPL-MCNC: 9.3 MG/DL (ref 8.6–10.2)
CHLORIDE BLD-SCNC: 104 MMOL/L (ref 98–107)
CO2: 25 MMOL/L (ref 22–29)
CREAT SERPL-MCNC: 0.8 MG/DL (ref 0.7–1.2)
CULTURE SURGICAL: ABNORMAL
CULTURE, BLOOD 2: NORMAL
EOSINOPHILS ABSOLUTE: 0.35 E9/L (ref 0.05–0.5)
EOSINOPHILS RELATIVE PERCENT: 5.2 % (ref 0–6)
GFR AFRICAN AMERICAN: >60
GFR NON-AFRICAN AMERICAN: >60 ML/MIN/1.73
GLUCOSE BLD-MCNC: 116 MG/DL (ref 74–99)
HCT VFR BLD CALC: 40.9 % (ref 37–54)
HEMOGLOBIN: 13.6 G/DL (ref 12.5–16.5)
IMMATURE GRANULOCYTES #: 0.03 E9/L
IMMATURE GRANULOCYTES %: 0.4 % (ref 0–5)
LYMPHOCYTES ABSOLUTE: 1.99 E9/L (ref 1.5–4)
LYMPHOCYTES RELATIVE PERCENT: 29.6 % (ref 20–42)
MCH RBC QN AUTO: 30.2 PG (ref 26–35)
MCHC RBC AUTO-ENTMCNC: 33.3 % (ref 32–34.5)
MCV RBC AUTO: 90.9 FL (ref 80–99.9)
MONOCYTES ABSOLUTE: 0.56 E9/L (ref 0.1–0.95)
MONOCYTES RELATIVE PERCENT: 8.3 % (ref 2–12)
NEUTROPHILS ABSOLUTE: 3.76 E9/L (ref 1.8–7.3)
NEUTROPHILS RELATIVE PERCENT: 55.9 % (ref 43–80)
ORGANISM: ABNORMAL
PDW BLD-RTO: 12.6 FL (ref 11.5–15)
PLATELET # BLD: 203 E9/L (ref 130–450)
PMV BLD AUTO: 11.1 FL (ref 7–12)
POTASSIUM REFLEX MAGNESIUM: 3.8 MMOL/L (ref 3.5–5)
RBC # BLD: 4.5 E12/L (ref 3.8–5.8)
SODIUM BLD-SCNC: 140 MMOL/L (ref 132–146)
WBC # BLD: 6.7 E9/L (ref 4.5–11.5)

## 2022-10-01 PROCEDURE — 2580000003 HC RX 258

## 2022-10-01 PROCEDURE — C1751 CATH, INF, PER/CENT/MIDLINE: HCPCS

## 2022-10-01 PROCEDURE — 02HV33Z INSERTION OF INFUSION DEVICE INTO SUPERIOR VENA CAVA, PERCUTANEOUS APPROACH: ICD-10-PCS | Performed by: STUDENT IN AN ORGANIZED HEALTH CARE EDUCATION/TRAINING PROGRAM

## 2022-10-01 PROCEDURE — 2580000003 HC RX 258: Performed by: NURSE PRACTITIONER

## 2022-10-01 PROCEDURE — 6370000000 HC RX 637 (ALT 250 FOR IP): Performed by: ORTHOPAEDIC SURGERY

## 2022-10-01 PROCEDURE — 80048 BASIC METABOLIC PNL TOTAL CA: CPT

## 2022-10-01 PROCEDURE — 99232 SBSQ HOSP IP/OBS MODERATE 35: CPT | Performed by: STUDENT IN AN ORGANIZED HEALTH CARE EDUCATION/TRAINING PROGRAM

## 2022-10-01 PROCEDURE — 2500000003 HC RX 250 WO HCPCS: Performed by: NURSE PRACTITIONER

## 2022-10-01 PROCEDURE — 6360000002 HC RX W HCPCS

## 2022-10-01 PROCEDURE — 76937 US GUIDE VASCULAR ACCESS: CPT

## 2022-10-01 PROCEDURE — 85025 COMPLETE CBC W/AUTO DIFF WBC: CPT

## 2022-10-01 PROCEDURE — 1200000000 HC SEMI PRIVATE

## 2022-10-01 PROCEDURE — 36415 COLL VENOUS BLD VENIPUNCTURE: CPT

## 2022-10-01 PROCEDURE — 36569 INSJ PICC 5 YR+ W/O IMAGING: CPT

## 2022-10-01 RX ADMIN — WATER 2000 MG: 1 INJECTION INTRAMUSCULAR; INTRAVENOUS; SUBCUTANEOUS at 00:50

## 2022-10-01 RX ADMIN — WATER 2000 MG: 1 INJECTION INTRAMUSCULAR; INTRAVENOUS; SUBCUTANEOUS at 17:27

## 2022-10-01 RX ADMIN — FLUOXETINE HYDROCHLORIDE 20 MG: 20 CAPSULE ORAL at 08:55

## 2022-10-01 RX ADMIN — ATORVASTATIN CALCIUM 20 MG: 20 TABLET, FILM COATED ORAL at 08:55

## 2022-10-01 RX ADMIN — WATER 2000 MG: 1 INJECTION INTRAMUSCULAR; INTRAVENOUS; SUBCUTANEOUS at 08:55

## 2022-10-01 RX ADMIN — SODIUM CHLORIDE, PRESERVATIVE FREE 10 ML: 5 INJECTION INTRAVENOUS at 08:56

## 2022-10-01 RX ADMIN — LIDOCAINE HYDROCHLORIDE 0.5 ML: 10 SOLUTION INTRAVENOUS at 11:00

## 2022-10-01 RX ADMIN — SODIUM CHLORIDE, PRESERVATIVE FREE 10 ML: 5 INJECTION INTRAVENOUS at 17:28

## 2022-10-01 ASSESSMENT — PAIN SCALES - GENERAL: PAINLEVEL_OUTOF10: 4

## 2022-10-01 NOTE — PLAN OF CARE
Problem: Pain  Goal: Verbalizes/displays adequate comfort level or baseline comfort level  9/30/2022 2309 by Tao Hendrickson  Outcome: Progressing  9/30/2022 0920 by Canelo Chaudhary RN  Outcome: Progressing     Problem: Discharge Planning  Goal: Discharge to home or other facility with appropriate resources  9/30/2022 2309 by Tao Hendrickson  Outcome: Progressing  9/30/2022 0920 by Canelo Chaudhary RN  Outcome: Progressing     Problem: Nutrition Deficit:  Goal: Optimize nutritional status  Outcome: Progressing

## 2022-10-01 NOTE — PROGRESS NOTES
[facility] Progress Note    Admitting Date and Time: 9/25/2022  7:14 PM  Admit Dx: Cellulitis [L03.90]  Injury of left hand, initial encounter [S69.92XA]  Cellulitis of other specified site [L03.818]    Subjective:  Patient is being followed for Cellulitis [L03.90]  Injury of left hand, initial encounter [S69.92XA]  Cellulitis of other specified site [J09.658]   Pt not been able to be examined on 3 occasions, getting PICC line, taking shower, not in room. Per RN: No major concerns. ROS: denies fever, chills, cp, sob, n/v, HA unless stated above.      enoxaparin  40 mg SubCUTAneous Daily    ceFAZolin  2,000 mg IntraVENous Q8H    sodium chloride flush  5-40 mL IntraVENous 2 times per day    atorvastatin  20 mg Oral Daily    FLUoxetine  20 mg Oral Daily     sodium chloride flush, 5-40 mL, PRN  sodium chloride, 25 mL, PRN  ondansetron, 4 mg, Q8H PRN   Or  ondansetron, 4 mg, Q6H PRN  polyethylene glycol, 17 g, Daily PRN  acetaminophen, 650 mg, Q6H PRN   Or  acetaminophen, 650 mg, Q6H PRN       Objective:    /73   Pulse 70   Temp 98.4 °F (36.9 °C) (Oral)   Resp 18   Ht 6' (1.829 m)   Wt 289 lb 4.8 oz (131.2 kg)   SpO2 97%   BMI 39.24 kg/m²     General Appearance: alert and oriented to person, place and time and in no acute distress  Skin: warm and dry  Head: normocephalic and atraumatic  Eyes: pupils equal, round, and reactive to light, extraocular eye movements intact, conjunctivae normal  Neck: neck supple and non tender without mass   Pulmonary/Chest: clear to auscultation bilaterally- no wheezes, rales or rhonchi, normal air movement, no respiratory distress  Cardiovascular: normal rate, normal S1 and S2 and no carotid bruits  Abdomen: soft, non-tender, non-distended, normal bowel sounds, no masses or organomegaly  Extremities: no cyanosis, no clubbing and no edema, left hand erythema, selling, C/D/I dressings.   Neurologic: no cranial nerve deficit and speech normal        Recent Labs     09/29/22  0924 10/01/22  0140    140   K 4.4 3.8    104   CO2 24 25   BUN 14 13   CREATININE 0.8 0.8   GLUCOSE 83 116*   CALCIUM 9.2 9.3       Recent Labs     09/29/22  0924 10/01/22  0140   WBC 6.4 6.7   RBC 4.67 4.50   HGB 14.1 13.6   HCT 42.6 40.9   MCV 91.2 90.9   MCH 30.2 30.2   MCHC 33.1 33.3   RDW 13.0 12.6    203   MPV 10.7 11.1       Micro:  No components found for: [facility])    Radiology:   No results found. Assessment:    Principal Problem:    Cellulitis  Active Problems:    Hyperlipidemia    Obesity (BMI 30-39. 9)    Hyperglycemia  Resolved Problems:    * No resolved hospital problems. *      Plan:  Left hand  abscess - traumatic extensor tendon laceration -  I&D- 9/29 per orthopedics, patient improving well, patient okay for DC from orthopedics side, patient will need repair of lacerated tendon at a later date. ID on board, s/p PICC, s/p Vanc, now on Ancef. Hyperlipidemia, stable on statins. Depression, on Prozac. DVT Prophylaxis - Lovenox    NOTE: This report was transcribed using voice recognition software. Every effort was made to ensure accuracy; however, inadvertent computerized transcription errors may be present.   Electronically signed by Johan Silva MD on 10/1/2022 at 2:49 PM

## 2022-10-01 NOTE — PROGRESS NOTES
[address] [place] [address] [facility]  Progress Note      Chief Complaint   Patient presents with    Hand Injury     Left hand injury Friday. Sutured at urgent care. Taking abx. Hand red and swollen       SUBJECTIVE:  Patient is tolerating medications. No reported adverse drug reactions. No nausea, vomiting, diarrhea. Laying in bed, wife and daughter at bedside  PICC was just placed   Afebrile     Review of systems:  As stated above in the chief complaint, otherwise negative. Medications:  Scheduled Meds:   enoxaparin  40 mg SubCUTAneous Daily    ceFAZolin  2,000 mg IntraVENous Q8H    lidocaine 1 % injection  5 mL IntraDERmal Once    sodium chloride flush  5-40 mL IntraVENous 2 times per day    atorvastatin  20 mg Oral Daily    FLUoxetine  20 mg Oral Daily     Continuous Infusions:   sodium chloride       PRN Meds:sodium chloride flush, sodium chloride, ondansetron **OR** ondansetron, polyethylene glycol, acetaminophen **OR** acetaminophen    OBJECTIVE:  /73   Pulse 70   Temp 98.4 °F (36.9 °C) (Oral)   Resp 18   Ht 6' (1.829 m)   Wt 289 lb 4.8 oz (131.2 kg)   SpO2 97%   BMI 39.24 kg/m²   Temp  Av.5 °F (36.9 °C)  Min: 98.4 °F (36.9 °C)  Max: 98.5 °F (36.9 °C)  Constitutional: The patient is awake, alert, and oriented. Laying in bed. Wife at bedside. Skin: Warm and dry. No rashes were noted. HEENT: Round and reactive pupils. Moist mucous membranes. No ulcerations or thrush. Neck: Supple to movements. Chest: No use of accessory muscles to breathe. Symmetrical expansion. No wheezing, crackles or rhonchi. Cardiovascular: S1 and S2 are rhythmic and regular. No murmurs appreciated. Abdomen: Positive bowel sounds to auscultation. Benign to palpation. Genitourinary: male  Extremities: No clubbing, no cyanosis, no edema.  Left hand dressed, swelling good rom fingers  Lines: peripheral   R Basilic PICC 65/0/62 19UG     Laboratory and Tests Review:  Lab Results   Component Value Date    WBC 6.7 10/01/2022    WBC 6.4 09/29/2022    WBC 8.9 09/26/2022    HGB 13.6 10/01/2022    HCT 40.9 10/01/2022    MCV 90.9 10/01/2022     10/01/2022     Lab Results   Component Value Date    NEUTROABS 3.76 10/01/2022    NEUTROABS 4.39 09/29/2022    NEUTROABS 6.30 09/26/2022     No results found for: CRPHS  Lab Results   Component Value Date    ALT 33 09/29/2022    AST 29 09/29/2022    ALKPHOS 93 09/29/2022    BILITOT 0.4 09/29/2022     Lab Results   Component Value Date/Time     10/01/2022 01:40 AM    K 3.8 10/01/2022 01:40 AM     10/01/2022 01:40 AM    CO2 25 10/01/2022 01:40 AM    BUN 13 10/01/2022 01:40 AM    CREATININE 0.8 10/01/2022 01:40 AM    CREATININE 0.8 09/29/2022 09:24 AM    CREATININE 0.9 09/26/2022 03:00 AM    GFRAA >60 10/01/2022 01:40 AM    LABGLOM >60 10/01/2022 01:40 AM    GLUCOSE 116 10/01/2022 01:40 AM    PROT 7.1 09/29/2022 09:24 AM    LABALBU 3.8 09/29/2022 09:24 AM    CALCIUM 9.3 10/01/2022 01:40 AM    BILITOT 0.4 09/29/2022 09:24 AM    ALKPHOS 93 09/29/2022 09:24 AM    AST 29 09/29/2022 09:24 AM    ALT 33 09/29/2022 09:24 AM     No results found for: CRP  No results found for: [address]  Radiology:      Microbiology:   Wound Culture 9/27/22; MSSA  Surgical Cultures 9/28/22: MSSA    Blood Cultures 9/25/22: negative so far    Assessment:  Deep left hand abscess with open wound: Trauma related  9/28/22 S/p  Irrigation and non excisional debridement left middle finger-small amount of yellowish fluid, extensor tendon was lacerated. Plan:    Continue IV Ancef 2g q8 - planning for IV antibiotic for 3-4 weeks  Monitor labs   Will follow with you     Electronically signed by LASHON Amaral CNP on 10/1/2022 at 10:51 AM     Pt seen and examined. Above discussed agree with advanced practice nurse. Labs, cultures, and radiographs reviewed. Face to Face encounter occurred. Changes made as necessary.      Shannon Diaz MD

## 2022-10-01 NOTE — PROCEDURES
PICC    Catheter insertion date: 10/1/2022     Product Number:  [id number]   Lot No: [id number]   Gauge: 17   Lumen: single, 4.5 Fr   R Basilic    Vein Diameter: 0.60 cm   Arm circumference at insertion site: 32.5 cm   Catheter Length: 47 cm   Internal Length: 46 cm   External Catheter Length: 1 cm   Ultrasound Used: yes  VPS [place] confirms PICC tip is placed in the lower 1/3 of the SVC or at the Cavoatrial junction. Floor nurse notified PICC is okay to use.    : Haley Street RN

## 2022-10-02 LAB
ANION GAP SERPL CALCULATED.3IONS-SCNC: 9 MMOL/L (ref 7–16)
BASOPHILS ABSOLUTE: 0.06 E9/L (ref 0–0.2)
BASOPHILS RELATIVE PERCENT: 0.8 % (ref 0–2)
BUN BLDV-MCNC: 15 MG/DL (ref 6–20)
CALCIUM SERPL-MCNC: 9 MG/DL (ref 8.6–10.2)
CHLORIDE BLD-SCNC: 103 MMOL/L (ref 98–107)
CO2: 25 MMOL/L (ref 22–29)
CREAT SERPL-MCNC: 0.9 MG/DL (ref 0.7–1.2)
EOSINOPHILS ABSOLUTE: 0.4 E9/L (ref 0.05–0.5)
EOSINOPHILS RELATIVE PERCENT: 5.1 % (ref 0–6)
GFR AFRICAN AMERICAN: >60
GFR NON-AFRICAN AMERICAN: >60 ML/MIN/1.73
GLUCOSE BLD-MCNC: 102 MG/DL (ref 74–99)
HCT VFR BLD CALC: 39.9 % (ref 37–54)
HEMOGLOBIN: 13.5 G/DL (ref 12.5–16.5)
IMMATURE GRANULOCYTES #: 0.03 E9/L
IMMATURE GRANULOCYTES %: 0.4 % (ref 0–5)
LYMPHOCYTES ABSOLUTE: 2.39 E9/L (ref 1.5–4)
LYMPHOCYTES RELATIVE PERCENT: 30.2 % (ref 20–42)
MCH RBC QN AUTO: 31 PG (ref 26–35)
MCHC RBC AUTO-ENTMCNC: 33.8 % (ref 32–34.5)
MCV RBC AUTO: 91.5 FL (ref 80–99.9)
MONOCYTES ABSOLUTE: 0.61 E9/L (ref 0.1–0.95)
MONOCYTES RELATIVE PERCENT: 7.7 % (ref 2–12)
NEUTROPHILS ABSOLUTE: 4.43 E9/L (ref 1.8–7.3)
NEUTROPHILS RELATIVE PERCENT: 55.8 % (ref 43–80)
PDW BLD-RTO: 12.6 FL (ref 11.5–15)
PLATELET # BLD: 220 E9/L (ref 130–450)
PMV BLD AUTO: 10.2 FL (ref 7–12)
POTASSIUM REFLEX MAGNESIUM: 4.2 MMOL/L (ref 3.5–5)
RBC # BLD: 4.36 E12/L (ref 3.8–5.8)
SODIUM BLD-SCNC: 137 MMOL/L (ref 132–146)
WBC # BLD: 7.9 E9/L (ref 4.5–11.5)

## 2022-10-02 PROCEDURE — 80048 BASIC METABOLIC PNL TOTAL CA: CPT

## 2022-10-02 PROCEDURE — 99232 SBSQ HOSP IP/OBS MODERATE 35: CPT | Performed by: STUDENT IN AN ORGANIZED HEALTH CARE EDUCATION/TRAINING PROGRAM

## 2022-10-02 PROCEDURE — 85025 COMPLETE CBC W/AUTO DIFF WBC: CPT

## 2022-10-02 PROCEDURE — 1200000000 HC SEMI PRIVATE

## 2022-10-02 PROCEDURE — 36415 COLL VENOUS BLD VENIPUNCTURE: CPT

## 2022-10-02 PROCEDURE — 2580000003 HC RX 258

## 2022-10-02 PROCEDURE — 2580000003 HC RX 258: Performed by: NURSE PRACTITIONER

## 2022-10-02 PROCEDURE — 6370000000 HC RX 637 (ALT 250 FOR IP): Performed by: ORTHOPAEDIC SURGERY

## 2022-10-02 PROCEDURE — 6360000002 HC RX W HCPCS

## 2022-10-02 RX ADMIN — WATER 2000 MG: 1 INJECTION INTRAMUSCULAR; INTRAVENOUS; SUBCUTANEOUS at 08:44

## 2022-10-02 RX ADMIN — SODIUM CHLORIDE, PRESERVATIVE FREE 10 ML: 5 INJECTION INTRAVENOUS at 19:40

## 2022-10-02 RX ADMIN — FLUOXETINE HYDROCHLORIDE 20 MG: 20 CAPSULE ORAL at 08:43

## 2022-10-02 RX ADMIN — ATORVASTATIN CALCIUM 20 MG: 20 TABLET, FILM COATED ORAL at 08:43

## 2022-10-02 RX ADMIN — SODIUM CHLORIDE, PRESERVATIVE FREE 10 ML: 5 INJECTION INTRAVENOUS at 17:13

## 2022-10-02 RX ADMIN — SODIUM CHLORIDE, PRESERVATIVE FREE 10 ML: 5 INJECTION INTRAVENOUS at 01:24

## 2022-10-02 RX ADMIN — WATER 2000 MG: 1 INJECTION INTRAMUSCULAR; INTRAVENOUS; SUBCUTANEOUS at 17:13

## 2022-10-02 RX ADMIN — SODIUM CHLORIDE, PRESERVATIVE FREE 10 ML: 5 INJECTION INTRAVENOUS at 08:44

## 2022-10-02 RX ADMIN — WATER 2000 MG: 1 INJECTION INTRAMUSCULAR; INTRAVENOUS; SUBCUTANEOUS at 01:24

## 2022-10-02 ASSESSMENT — PAIN SCALES - GENERAL: PAINLEVEL_OUTOF10: 1

## 2022-10-02 ASSESSMENT — PAIN DESCRIPTION - LOCATION: LOCATION: HAND

## 2022-10-02 ASSESSMENT — PAIN DESCRIPTION - ORIENTATION: ORIENTATION: LEFT

## 2022-10-02 ASSESSMENT — PAIN DESCRIPTION - DESCRIPTORS: DESCRIPTORS: ACHING;DISCOMFORT

## 2022-10-02 ASSESSMENT — PAIN DESCRIPTION - ONSET: ONSET: GRADUAL

## 2022-10-02 ASSESSMENT — PAIN DESCRIPTION - FREQUENCY: FREQUENCY: INTERMITTENT

## 2022-10-02 NOTE — PROGRESS NOTES
[facility] Progress Note    Admitting Date and Time: 9/25/2022  7:14 PM  Admit Dx: Cellulitis [L03.90]  Injury of left hand, initial encounter [S69.92XA]  Cellulitis of other specified site [L03.818]    Subjective:  Patient is being followed for Cellulitis [L03.90]  Injury of left hand, initial encounter [S69.92XA]  Cellulitis of other specified site [G17.578]   Pt reports tolerating meds. Per RN: No major concerns. ROS: denies fever, chills, cp, sob, n/v, HA unless stated above.      enoxaparin  40 mg SubCUTAneous Daily    ceFAZolin  2,000 mg IntraVENous Q8H    sodium chloride flush  5-40 mL IntraVENous 2 times per day    atorvastatin  20 mg Oral Daily    FLUoxetine  20 mg Oral Daily     sodium chloride flush, 5-40 mL, PRN  sodium chloride, 25 mL, PRN  ondansetron, 4 mg, Q8H PRN   Or  ondansetron, 4 mg, Q6H PRN  polyethylene glycol, 17 g, Daily PRN  acetaminophen, 650 mg, Q6H PRN   Or  acetaminophen, 650 mg, Q6H PRN       Objective:    /62   Pulse 67   Temp 97.4 °F (36.3 °C) (Oral)   Resp 18   Ht 6' (1.829 m)   Wt 289 lb (131.1 kg)   SpO2 97%   BMI 39.20 kg/m²     General Appearance: alert and oriented to person, place and time and in no acute distress  Skin: warm and dry  Head: normocephalic and atraumatic  Eyes: pupils equal, round, and reactive to light, extraocular eye movements intact, conjunctivae normal  Neck: neck supple and non tender without mass   Pulmonary/Chest: clear to auscultation bilaterally- no wheezes, rales or rhonchi, normal air movement, no respiratory distress  Cardiovascular: normal rate, normal S1 and S2 and no carotid bruits  Abdomen: soft, non-tender, non-distended, normal bowel sounds, no masses or organomegaly  Extremities: no cyanosis, no clubbing and no edema, left hand erythema, selling, C/D/I dressings.   Neurologic: no cranial nerve deficit and speech normal        Recent Labs     10/01/22  0140 10/02/22  0130    137   K 3.8 4.2    103   CO2 25 25   BUN 13 15   CREATININE 0.8 0.9   GLUCOSE 116* 102*   CALCIUM 9.3 9.0       Recent Labs     10/01/22  0140 10/02/22  0130   WBC 6.7 7.9   RBC 4.50 4.36   HGB 13.6 13.5   HCT 40.9 39.9   MCV 90.9 91.5   MCH 30.2 31.0   MCHC 33.3 33.8   RDW 12.6 12.6    220   MPV 11.1 10.2       Micro:  No components found for: [facility])    Radiology:   No results found. Assessment:    Principal Problem:    Cellulitis  Active Problems:    Hyperlipidemia    Obesity (BMI 30-39. 9)    Hyperglycemia  Resolved Problems:    * No resolved hospital problems. *      Plan:  Left hand  abscess - traumatic extensor tendon laceration -  I&D- 9/29 per orthopedics, patient improving well, patient okay for DC from orthopedics side, patient will need repair of lacerated tendon at a later date. ID on board, s/p PICC, s/p Vanc, now on Ancef. Hyperlipidemia, stable on statins. Depression, on Prozac. DVT Prophylaxis - Lovenox    NOTE: This report was transcribed using voice recognition software. Every effort was made to ensure accuracy; however, inadvertent computerized transcription errors may be present.   Electronically signed by Johan Silva MD on 10/2/2022 at 2:20 PM

## 2022-10-02 NOTE — PROGRESS NOTES
[address] [place] [address] [facility]  Progress Note      Chief Complaint   Patient presents with    Hand Injury     Left hand injury Friday. Sutured at urgent care. Taking abx. Hand red and swollen       SUBJECTIVE:  Patient is tolerating medications. No reported adverse drug reactions. No nausea, vomiting, diarrhea. Sitting up on couch, eating lunch  Waiting to speak with SW/CM tomorrow   No new complaints   Afebrile     Review of systems:  As stated above in the chief complaint, otherwise negative. Medications:  Scheduled Meds:   enoxaparin  40 mg SubCUTAneous Daily    ceFAZolin  2,000 mg IntraVENous Q8H    sodium chloride flush  5-40 mL IntraVENous 2 times per day    atorvastatin  20 mg Oral Daily    FLUoxetine  20 mg Oral Daily     Continuous Infusions:   sodium chloride       PRN Meds:sodium chloride flush, sodium chloride, ondansetron **OR** ondansetron, polyethylene glycol, acetaminophen **OR** acetaminophen    OBJECTIVE:  /62   Pulse 67   Temp 97.4 °F (36.3 °C) (Oral)   Resp 18   Ht 6' (1.829 m)   Wt 289 lb (131.1 kg)   SpO2 97%   BMI 39.20 kg/m²   Temp  Av.8 °F (36.6 °C)  Min: 97.4 °F (36.3 °C)  Max: 98.1 °F (36.7 °C)  Constitutional: The patient is awake, alert, and oriented. Up on couch, eating lunch. Skin: Warm and dry. No rashes were noted. HEENT: Round and reactive pupils. Moist mucous membranes. No ulcerations or thrush. Neck: Supple to movements. Chest: No use of accessory muscles to breathe. Symmetrical expansion. No wheezing, crackles or rhonchi. Cardiovascular: S1 and S2 are rhythmic and regular. No murmurs appreciated. Abdomen: Positive bowel sounds to auscultation. Benign to palpation. Genitourinary: male  Extremities: No clubbing, no cyanosis, no edema. Left hand dressed.   Lines: peripheral   R Basilic PICC  12XK     Laboratory and Tests Review:  Lab Results   Component Value Date    WBC 7.9 10/02/2022    WBC 6.7 10/01/2022    WBC 6.4 09/29/2022    HGB 13.5 10/02/2022    HCT 39.9 10/02/2022    MCV 91.5 10/02/2022     10/02/2022     Lab Results   Component Value Date    NEUTROABS 4.43 10/02/2022    NEUTROABS 3.76 10/01/2022    NEUTROABS 4.39 09/29/2022     No results found for: CRPHS  Lab Results   Component Value Date    ALT 33 09/29/2022    AST 29 09/29/2022    ALKPHOS 93 09/29/2022    BILITOT 0.4 09/29/2022     Lab Results   Component Value Date/Time     10/02/2022 01:30 AM    K 4.2 10/02/2022 01:30 AM     10/02/2022 01:30 AM    CO2 25 10/02/2022 01:30 AM    BUN 15 10/02/2022 01:30 AM    CREATININE 0.9 10/02/2022 01:30 AM    CREATININE 0.8 10/01/2022 01:40 AM    CREATININE 0.8 09/29/2022 09:24 AM    GFRAA >60 10/02/2022 01:30 AM    LABGLOM >60 10/02/2022 01:30 AM    GLUCOSE 102 10/02/2022 01:30 AM    PROT 7.1 09/29/2022 09:24 AM    LABALBU 3.8 09/29/2022 09:24 AM    CALCIUM 9.0 10/02/2022 01:30 AM    BILITOT 0.4 09/29/2022 09:24 AM    ALKPHOS 93 09/29/2022 09:24 AM    AST 29 09/29/2022 09:24 AM    ALT 33 09/29/2022 09:24 AM     No results found for: CRP  No results found for: [address]  Radiology:      Microbiology:   Wound Culture 9/27/22; MSSA  Surgical Cultures 9/28/22: MSSA    Blood Cultures 9/25/22: negative so far    Assessment:  Deep left hand abscess with open wound: Trauma related  9/28/22 S/p  Irrigation and non excisional debridement left middle finger-small amount of yellowish fluid, extensor tendon was lacerated.      Plan:    Continue IV Ancef 2g q8 - planning for IV antibiotic for 3-4 weeks  Monitor labs   Will follow with you     Electronically signed by LASHON Burton CNP on 10/2/2022 at 11:45 AM

## 2022-10-03 VITALS
HEART RATE: 72 BPM | SYSTOLIC BLOOD PRESSURE: 127 MMHG | WEIGHT: 287 LBS | RESPIRATION RATE: 18 BRPM | OXYGEN SATURATION: 98 % | HEIGHT: 72 IN | TEMPERATURE: 97.8 F | BODY MASS INDEX: 38.87 KG/M2 | DIASTOLIC BLOOD PRESSURE: 77 MMHG

## 2022-10-03 PROCEDURE — 2580000003 HC RX 258: Performed by: NURSE PRACTITIONER

## 2022-10-03 PROCEDURE — 2580000003 HC RX 258

## 2022-10-03 PROCEDURE — 6360000002 HC RX W HCPCS

## 2022-10-03 PROCEDURE — 6370000000 HC RX 637 (ALT 250 FOR IP): Performed by: ORTHOPAEDIC SURGERY

## 2022-10-03 PROCEDURE — 99239 HOSP IP/OBS DSCHRG MGMT >30: CPT | Performed by: STUDENT IN AN ORGANIZED HEALTH CARE EDUCATION/TRAINING PROGRAM

## 2022-10-03 PROCEDURE — 6360000002 HC RX W HCPCS: Performed by: STUDENT IN AN ORGANIZED HEALTH CARE EDUCATION/TRAINING PROGRAM

## 2022-10-03 RX ORDER — ENOXAPARIN SODIUM 100 MG/ML
30 INJECTION SUBCUTANEOUS 2 TIMES DAILY
Status: DISCONTINUED | OUTPATIENT
Start: 2022-10-03 | End: 2022-10-03 | Stop reason: HOSPADM

## 2022-10-03 RX ORDER — HEPARIN SODIUM (PORCINE) LOCK FLUSH IV SOLN 100 UNIT/ML 100 UNIT/ML
300 SOLUTION INTRAVENOUS PRN
Status: DISCONTINUED | OUTPATIENT
Start: 2022-10-03 | End: 2022-10-03 | Stop reason: HOSPADM

## 2022-10-03 RX ADMIN — ATORVASTATIN CALCIUM 20 MG: 20 TABLET, FILM COATED ORAL at 09:25

## 2022-10-03 RX ADMIN — FLUOXETINE HYDROCHLORIDE 20 MG: 20 CAPSULE ORAL at 09:25

## 2022-10-03 RX ADMIN — WATER 2000 MG: 1 INJECTION INTRAMUSCULAR; INTRAVENOUS; SUBCUTANEOUS at 16:27

## 2022-10-03 RX ADMIN — SODIUM CHLORIDE, PRESERVATIVE FREE 10 ML: 5 INJECTION INTRAVENOUS at 09:26

## 2022-10-03 RX ADMIN — Medication 300 UNITS: at 16:28

## 2022-10-03 RX ADMIN — SODIUM CHLORIDE, PRESERVATIVE FREE 10 ML: 5 INJECTION INTRAVENOUS at 16:27

## 2022-10-03 RX ADMIN — WATER 2000 MG: 1 INJECTION INTRAMUSCULAR; INTRAVENOUS; SUBCUTANEOUS at 09:25

## 2022-10-03 RX ADMIN — WATER 2000 MG: 1 INJECTION INTRAMUSCULAR; INTRAVENOUS; SUBCUTANEOUS at 01:09

## 2022-10-03 ASSESSMENT — PAIN SCALES - GENERAL: PAINLEVEL_OUTOF10: 0

## 2022-10-03 NOTE — PLAN OF CARE
Problem: Pain  Goal: Verbalizes/displays adequate comfort level or baseline comfort level  Outcome: Progressing     Problem: Discharge Planning  Goal: Discharge to home or other facility with appropriate resources  Outcome: Progressing     Problem: Nutrition Deficit:  Goal: Optimize nutritional status  Outcome: Progressing

## 2022-10-03 NOTE — DISCHARGE INSTRUCTIONS
[address] [place] [address] [facility]  ([address])  [address] [facility] [address]  [place], [address] [facility]  Phone [phone]   Fax [phone]    [facility]. Katerine Alanis MD, Yanelis Lawson, MD Nicholas Gonzalez, MD [facility], MD Roc Rosas, MD Elise Jensen, CNS   Ruchi Sanches, APRN, CNS  Yue Poplar, APRN-CNP    Paras Harvey, APRN, CNS  Jaz Necessary, APRN-CNP               STANDING ORDERS (ID Protocol)     Visiting nurses are to write the Primary Care Physician and their own call back number on all laboratory requisition forms. Abnormal lab values are called to the physician by the nurse and NOT by the laboratory. Fax all labs to the office in a timely manner, during office hours. All faxes should include nurses name and call back number. Vascular Access Devices or VADs (TLC, PICC, Midline, etc) will be replaced as necessary. Draw all blood work from VADs, except for drug levels. If unable to access a VAD, insert a peripheral catheter temporarily. Contact the Primary Care Physician or [facility] office for surgical referral.  Use tPA (Era Saint) as per agency protocol to restore patency of VAD. Saline flush 10ml or heparin flush 10U/cc IV daily and as needed to maintain line patency. Remove VAD upon completion of IV antibiotics, unless otherwise specified by the ordering physician. If VAD cannot be removed, schedule appointment at office for removal.  If VAD was placed by Radiology, schedule appointment for removal.  Notify ordering physician or office if patient requires admission to the hospital with reason for admission. Discontinue all blood work upon completion of IV antibiotics, unless otherwise specified by ordering physician. Notify ordering physician if the patient does not receive the scheduled antibiotic for 24 hours or more.   The Pharmacy and [address] [place] [address] may adjust the timing of the infusion and blood work to accommodate the patients home care conditions. When PICC or VAD is to be removed, documentation of length of inserted PICC. PICC or VAD length is to correlate with inserted length and sent to physician at the time of removal.  Give the patient a list of antibiotics being administered with:  Drug name  Route  Frequency  Start/Stop date      ROUTINE LABS TO BE DRAWN/ORDERED:  Twice weekly (preferably every Monday & Thursday):  CMP  Complete Blood Count with differential (CBC with dif)  Once weekly:  C-Reactive Protein (not high sensitivity CRP)  Erythrocyte/Westergren Sedimentation Rate (WSR or ESR)  Total CPK for patients on Daptomycin (Cubicin®). Obtain CPK more often if the patient is experiencing muscle weakness or myalgias. Clinical Pharmacist is to adjust Vancomycin and Aminoglycosides. Clinical pharmacist is  encouraged to follow: \"Therapeutic Monitoring of Vancomycin for Serious Methicillin-resistant Staphylococcus aureus Infections: A Revised Consensus Guideline and Review by the American Society of Health-system Pharmacists, the Infectious Diseases Society of [facility], the Pediatric Infectious Diseases Society, and the Society of Infectious Diseases Pharmacists\" (https://doi.org/10.1093/maura/pgax605). If a clinical calculator is not available, clinical pharmacist is to follow the orders below:  Draw Vancomycin trough 30 minutes before the third dose  After starting drug, or   After the dosing or interval is changed. If the trough level is between 5 and 20 continue dose as ordered. Draw troughs twice weekly thereafter until troughs are stable (i.e. until trough is between 10 and 20 mcg/ml for two consecutive laboratory values). Once stable check troughs once weekly or every third dose. Please do not call physician unless the trough is < 5 or >20. If the trough is <20 continue dosing as ordered. If the trough is >20 call the office for further orders.   Do not hold the dose while waiting for the trough result. Amingoglycosides (e.g. Gentamicin, Tobramycin and Amikacin) peaks and troughs should be drawn twice weekly (preferably on Mondays and Thursdays) or every third dose. Aminoglycoside peaks are not to be drawn if patient on Once-Daily Dosing (ODD). Call physician or office if the trough is:     >1 for gentamicin,   >2 for tobramycin, or   >5 for amikacin  When clinically indicated obtain:  Urine culture. If the patient has a fever with purulent drainage from Hwang or suprapubic catheter, or foul smelling urine. Do not irrigate a clogged Hwang catheter. Replace it. Blood cultures and Wound Gram stain with culture & sensitivity. If the patient has a fever or increasing drainage or foul odor from a wound. Notify the treating physician in a timely manner  Stool specimen. If diarrhea occurs while on antibiotics, send stools for C. difficile and WBCs. When a drug is discontinued due to a low white blood cell count (WBCs) draw two consecutive CBC with differential and BUN, Ceatinine. ALLERGIC OR ADVERSE REACTIONS TO MEDICATIONS  Mild reaction: (itching, with or without rash):  Administer Benadryl 50mg po x 1, then 25mg po q6h prn. Notify office or physician in a timely matter. Moderate reaction (itching with or without rash and/or wheezing, dyspnea, itchy throat):  Administer Benadryl 50 mg IV push x 1. Notify office or physician in a timely manner. Severe reaction i.e. Anaphylaxis (wheezing or stidor, sudden rash, lightheadedness, hypotension):  Administer epinephrine subcutaneous 0.3mg (1:1000) x 1 dose. May repeat twice every 5 minutes if needed. Call EMS and notify office or physician immediately. For all above reactions: administer Solu-Cortef 100mg slow IV push x 1. VASCULAR ACCESS DRESSING CHANGE PROTOCOL  Cleanse insertion site with ChlorPrep® or equivalent three times. Secure catheter with Steri-Strips®, Bone®, or equivalent securing device.   Apply Opsite® 3000 or equivalent transparent dressing. Change dressing twice weekly, maintaining sterile technique. If there is a BioPatch®, SilverSite® or equivalent, change once weekly only or as needed. FOLLOW-UP VISITS  Nursing staff should call the office during business hours to schedule a follow-up appointment once the patient is admitted to the service or facility. Every effort should be made to have patient follow-up within 2 weeks of discharge. Exception is made for ventilator-dependent patients. Continue IV antibiotic therapy until patient is seen in the office or unless specific stop date is noted on the original order or unless otherwise ordered by physician. Call office to ensure stop date is correct before stopping antibiotics.       Neil Gutierrez MD

## 2022-10-03 NOTE — PROGRESS NOTES
[address] [place] [address] [facility]  Progress Note      Chief Complaint   Patient presents with    Hand Injury     Left hand injury Friday. Sutured at urgent care. Taking abx. Hand red and swollen       SUBJECTIVE:  Patient is tolerating medications. No reported adverse drug reactions. No nausea, vomiting, diarrhea. Sitting up on couch, no new complaints  Feeling well  All questions were answered  Afebrile     Review of systems:  As stated above in the chief complaint, otherwise negative. Medications:  Scheduled Meds:   enoxaparin  40 mg SubCUTAneous Daily    ceFAZolin  2,000 mg IntraVENous Q8H    sodium chloride flush  5-40 mL IntraVENous 2 times per day    atorvastatin  20 mg Oral Daily    FLUoxetine  20 mg Oral Daily     Continuous Infusions:   sodium chloride       PRN Meds:sodium chloride flush, sodium chloride, ondansetron **OR** ondansetron, polyethylene glycol, acetaminophen **OR** acetaminophen    OBJECTIVE:  /77   Pulse 72   Temp 97.8 °F (36.6 °C) (Oral)   Resp 18   Ht 6' (1.829 m)   Wt 287 lb (130.2 kg)   SpO2 98%   BMI 38.92 kg/m²   Temp  Av.1 °F (36.7 °C)  Min: 97.8 °F (36.6 °C)  Max: 98.5 °F (36.9 °C)  Constitutional: The patient is awake, alert, and oriented. Up on couch. Skin: Warm and dry. No rashes were noted. HEENT: Round and reactive pupils. Moist mucous membranes. No ulcerations or thrush. Neck: Supple to movements. Chest: No use of accessory muscles to breathe. Symmetrical expansion. No wheezing, crackles or rhonchi. Cardiovascular: S1 and S2 are rhythmic and regular. No murmurs appreciated. Abdomen: Positive bowel sounds to auscultation. Benign to palpation. Genitourinary: male  Extremities: No clubbing, no cyanosis, no edema. Left hand dressed.   Lines: peripheral   R Basilic PICC 74 38NP     Laboratory and Tests Review:  Lab Results   Component Value Date    WBC 7.9 10/02/2022    WBC 6.7 10/01/2022    WBC 6.4 2022    HGB 13.5 10/02/2022    HCT 39.9 10/02/2022    MCV 91.5 10/02/2022     10/02/2022     Lab Results   Component Value Date    NEUTROABS 4.43 10/02/2022    NEUTROABS 3.76 10/01/2022    NEUTROABS 4.39 09/29/2022     No results found for: CRPHS  Lab Results   Component Value Date    ALT 33 09/29/2022    AST 29 09/29/2022    ALKPHOS 93 09/29/2022    BILITOT 0.4 09/29/2022     Lab Results   Component Value Date/Time     10/02/2022 01:30 AM    K 4.2 10/02/2022 01:30 AM     10/02/2022 01:30 AM    CO2 25 10/02/2022 01:30 AM    BUN 15 10/02/2022 01:30 AM    CREATININE 0.9 10/02/2022 01:30 AM    CREATININE 0.8 10/01/2022 01:40 AM    CREATININE 0.8 09/29/2022 09:24 AM    GFRAA >60 10/02/2022 01:30 AM    LABGLOM >60 10/02/2022 01:30 AM    GLUCOSE 102 10/02/2022 01:30 AM    PROT 7.1 09/29/2022 09:24 AM    LABALBU 3.8 09/29/2022 09:24 AM    CALCIUM 9.0 10/02/2022 01:30 AM    BILITOT 0.4 09/29/2022 09:24 AM    ALKPHOS 93 09/29/2022 09:24 AM    AST 29 09/29/2022 09:24 AM    ALT 33 09/29/2022 09:24 AM     No results found for: CRP  No results found for: [address]  Radiology:      Microbiology:   Wound Culture 9/27/22; MSSA  Surgical Cultures 9/28/22: MSSA    Blood Cultures 9/25/22: negative so far    Assessment:  Deep left hand abscess with open wound: Trauma related  9/28/22 S/p  Irrigation and non excisional debridement left middle finger-small amount of yellowish fluid, extensor tendon was lacerated. Plan:    Continue IV Ancef 2g q8 - planning for IV antibiotic for 4 weeks (10/26/22)  Monitor labs   Will follow with you  F/U with Dr. Byron Brumfield in 3 weeks   Okay to DC from ID POV, scripts in chart      Electronically signed by LASHON Chaney CNP on 10/3/2022 at 11:46 AM     Pt seen and examined. Above discussed agree with advanced practice nurse. Labs, cultures, and radiographs reviewed. Face to Face encounter occurred. Changes made as necessary.      Missy Wagner MD

## 2022-10-03 NOTE — CARE COORDINATION
Discharge and new [facility] orders noted. IV atb script printed and faxed to [facility]. Communicated with Selvin at [facility], informed her of above. Patient is on schedule for 10/4/22 AM for initial home visit. Met with patient and explained same. He is aware that at this time there is no established [facility] claim number. He is aware and agreeable to utilization of his [facility] primary insurance for care until [facility] case established. Did provide patient with a partially completed C-9 that can be sent to [facility] provider once case # established. He voiced understanding and appreciation. Bedside nurse updated as well. Yovany Obrien, MSN RN  [facility] Case Management  [phone]

## 2022-10-03 NOTE — PATIENT CARE CONFERENCE
P Quality Flow/Interdisciplinary Rounds Progress Note        Quality Flow Rounds held on October 3, 2022    Disciplines Attending:  Bedside Nurse, , , and Nursing Unit Leadership    Tania Yee was admitted on 9/25/2022  7:14 PM    Anticipated Discharge Date:       Disposition:    Hung Score:  Hung Scale Score: 23    Readmission Risk              Risk of Unplanned Readmission:  5           Discussed patient goal for the day, patient clinical progression, and barriers to discharge.   The following Goal(s) of the Day/Commitment(s) have been identified:  Discharge - Obtain Order      Joy Olguin RN  October 3, 2022

## 2022-10-03 NOTE — DISCHARGE SUMMARY
[facility] Physician Discharge Summary       Saqib Anderson MD  [address] [phone]    Call in 1 week(s)      [facility]  [facility] Luis Enrique Tapia MD  [address] [place] [address]  [address]  [phone]    Schedule an appointment as soon as possible for a visit in 3 week(s)        Activity level: As tolerated     Dispo: Home      Condition on discharge: Stable     Patient ID:  Rosi Prince  [id number]  41 y.o.  1975    Admit date: 9/25/2022    Discharge date and time:  10/3/2022  1:05 PM    Admission Diagnoses: Principal Problem:    Cellulitis  Active Problems:    Hyperlipidemia    Obesity (BMI 30-39. 9)    Hyperglycemia  Resolved Problems:    * No resolved hospital problems. *      Discharge Diagnoses: Principal Problem:    Cellulitis  Active Problems:    Hyperlipidemia    Obesity (BMI 30-39. 9)    Hyperglycemia  Resolved Problems:    * No resolved hospital problems. *      Consults:  IP CONSULT TO ORTHOPEDIC SURGERY  IP CONSULT TO INFECTIOUS DISEASES  IP CONSULT TO PHARMACY    Hospital Course:   Patient Rosi Prince is a 41 y.o. presented with Cellulitis [L03.90]  Injury of left hand, initial encounter [S69.92XA]  Cellulitis of other specified site [L03.818]  Patient was admitted and managed for Left hand  abscess - traumatic extensor tendon laceration -  I&D- 9/29 per orthopedics, patient will need repair of lacerated tendon at a later date. ID on board, s/p PICC, s/p Vanc, now on Ancef x 4 weeks.     Discharge Exam:  General Appearance: alert and oriented to person, place and time and in no acute distress  Skin: warm and dry  Head: normocephalic and atraumatic  Eyes: pupils equal, round, and reactive to light, extraocular eye movements intact, conjunctivae normal  Neck: neck supple and non tender without mass   Pulmonary/Chest: clear to auscultation bilaterally- no wheezes, rales or rhonchi, normal air movement, no respiratory distress  Cardiovascular: normal rate, normal S1 and S2 and no carotid bruits  Abdomen: soft, non-tender, non-distended, normal bowel sounds, no masses or organomegaly  Extremities: no cyanosis, no clubbing and no edema  Neurologic: no cranial nerve deficit and speech normal    No intake/output data recorded. I/O this shift: In: 480 [P.O.:480]  Out: -       LABS:  Recent Labs     10/01/22  0140 10/02/22  0130    137   K 3.8 4.2    103   CO2 25 25   BUN 13 15   CREATININE 0.8 0.9   GLUCOSE 116* 102*   CALCIUM 9.3 9.0       Recent Labs     10/01/22  0140 10/02/22  0130   WBC 6.7 7.9   RBC 4.50 4.36   HGB 13.6 13.5   HCT 40.9 39.9   MCV 90.9 91.5   MCH 30.2 31.0   MCHC 33.3 33.8   RDW 12.6 12.6    220   MPV 11.1 10.2       No results for input(s): POCGLU in the last 72 hours. Imaging:  XR HAND LEFT (MIN 3 VIEWS)    Result Date: 9/25/2022  EXAMINATION: THREE XRAY VIEWS OF THE LEFT HAND 9/25/2022 4:14 pm COMPARISON: None. HISTORY: ORDERING SYSTEM PROVIDED HISTORY: pain and swelling of dorsal aspect of hand, laceration on Friday TECHNOLOGIST PROVIDED HISTORY: Reason for exam:->pain and swelling of dorsal aspect of hand, laceration on Friday FINDINGS: Marked diffuse soft tissue swelling dorsum of the hand without evidence of acute fracture or dislocation. No radiopaque foreign body. Marked soft tissue swelling without evidence of acute fracture or radiopaque foreign body. Patient Instructions:      Medication List        START taking these medications      ceFAZolin  infusion  Commonly known as: ANCEF  Infuse 2,000 mg intravenously in the morning and 2,000 mg at noon and 2,000 mg in the evening. Do all this for 23 days. Compound per protocol. oxyCODONE-acetaminophen 5-325 MG per tablet  Commonly known as: Percocet  Take 1 tablet by mouth every 6 hours as needed for Pain for up to 7 days.  Intended supply: 7 days. Take lowest dose possible to manage pain            CONTINUE taking these medications      atorvastatin 20 MG tablet  Commonly known as: LIPITOR     FLUoxetine 20 MG capsule  Commonly known as: PROZAC            STOP taking these medications      meloxicam 15 MG tablet  Commonly known as: MOBIC               Where to Get Your Medications        You can get these medications from any pharmacy    Bring a paper prescription for each of these medications  ceFAZolin  infusion  oxyCODONE-acetaminophen 5-325 MG per tablet           Note that more than 30 minutes was spent in preparing discharge papers, discussing discharge with patient, medication review, etc.    Signed:  Electronically signed by Bijal Childress MD on 10/3/2022 at 1:05 PM

## 2022-10-03 NOTE — PROGRESS NOTES
Dr. Johan Silva MD,    Your patient is on a medication that requires a renal and/or weight dose adjustment. Renal/Body Weight Function Assessment:    Date Body Weight IBW  Adjusted BW SCr  CrCl Dialysis status   10/3/2022 287 lb (130.2 kg)  Ideal body weight: 77.6 kg (171 lb 1.2 oz)  Adjusted ideal body weight: 98.6 kg (217 lb 7.1 oz) Serum creatinine: 0.9 mg/dL 10/02/22 0130  Estimated creatinine clearance: 145 mL/min N/A       Pharmacy has dose-adjusted the following medication(s):    Date Previous Order Adjusted Order   10/3/2022 Lovenox 40 mg daily Lovenox 30 mg twice a day. These changes were made per protocol according to the [facility] Renal Dosing Policy/ [facility] Pharmacist Anticoagulant Review. *Please note this dose may need readjusted if your patient's condition changes. Please contact pharmacy with any questions regarding these changes.     JAILYN Thomas [facility]  10/3/2022  12:27 PM

## 2023-02-14 NOTE — PLAN OF CARE
Problem: Pain  Goal: Verbalizes/displays adequate comfort level or baseline comfort level  Outcome: Progressing     Problem: Discharge Planning  Goal: Discharge to home or other facility with appropriate resources  Outcome: Progressing How Severe Is Your Skin Lesion?: mild Has Your Skin Lesion Been Treated?: not been treated Is This A New Presentation, Or A Follow-Up?: Skin Lesion Is This A New Presentation, Or A Follow-Up?: Skin Lesions

## 2025-01-22 ENCOUNTER — OFFICE VISIT (OUTPATIENT)
Dept: PRIMARY CARE CLINIC | Age: 50
End: 2025-01-22
Payer: COMMERCIAL

## 2025-01-22 VITALS
OXYGEN SATURATION: 99 % | SYSTOLIC BLOOD PRESSURE: 124 MMHG | DIASTOLIC BLOOD PRESSURE: 80 MMHG | HEIGHT: 72 IN | WEIGHT: 277 LBS | HEART RATE: 67 BPM | TEMPERATURE: 98 F | BODY MASS INDEX: 37.52 KG/M2

## 2025-01-22 DIAGNOSIS — F41.1 GAD (GENERALIZED ANXIETY DISORDER): ICD-10-CM

## 2025-01-22 DIAGNOSIS — E78.5 HYPERLIPIDEMIA, UNSPECIFIED HYPERLIPIDEMIA TYPE: Primary | ICD-10-CM

## 2025-01-22 DIAGNOSIS — Z12.11 COLON CANCER SCREENING: ICD-10-CM

## 2025-01-22 DIAGNOSIS — M19.90 ARTHRITIS: ICD-10-CM

## 2025-01-22 DIAGNOSIS — L08.0 PUSTULAR RASH: ICD-10-CM

## 2025-01-22 LAB
ALBUMIN: 4.5 G/DL (ref 3.5–5.2)
ALP BLD-CCNC: 95 U/L (ref 40–129)
ALT SERPL-CCNC: 29 U/L (ref 0–40)
ANION GAP SERPL CALCULATED.3IONS-SCNC: 18 MMOL/L (ref 7–16)
AST SERPL-CCNC: 28 U/L (ref 0–39)
BILIRUB SERPL-MCNC: 0.6 MG/DL (ref 0–1.2)
BUN BLDV-MCNC: 17 MG/DL (ref 6–20)
CALCIUM SERPL-MCNC: 9.8 MG/DL (ref 8.6–10.2)
CHLORIDE BLD-SCNC: 104 MMOL/L (ref 98–107)
CHOLESTEROL, TOTAL: 193 MG/DL
CO2: 22 MMOL/L (ref 22–29)
CREAT SERPL-MCNC: 0.9 MG/DL (ref 0.7–1.2)
GFR, ESTIMATED: >90 ML/MIN/1.73M2
GLUCOSE BLD-MCNC: 110 MG/DL (ref 74–99)
HCT VFR BLD CALC: 45.4 % (ref 37–54)
HDLC SERPL-MCNC: 63 MG/DL
HEMOGLOBIN: 14.9 G/DL (ref 12.5–16.5)
LDL CHOLESTEROL: 113 MG/DL
MCH RBC QN AUTO: 30.2 PG (ref 26–35)
MCHC RBC AUTO-ENTMCNC: 32.8 G/DL (ref 32–34.5)
MCV RBC AUTO: 92.1 FL (ref 80–99.9)
PDW BLD-RTO: 12.7 % (ref 11.5–15)
PLATELET # BLD: 181 K/UL (ref 130–450)
PMV BLD AUTO: 11.9 FL (ref 7–12)
POTASSIUM SERPL-SCNC: 4.6 MMOL/L (ref 3.5–5)
RBC # BLD: 4.93 M/UL (ref 3.8–5.8)
SODIUM BLD-SCNC: 144 MMOL/L (ref 132–146)
TOTAL PROTEIN: 7.4 G/DL (ref 6.4–8.3)
TRIGL SERPL-MCNC: 83 MG/DL
VLDLC SERPL CALC-MCNC: 17 MG/DL
WBC # BLD: 5.4 K/UL (ref 4.5–11.5)

## 2025-01-22 PROCEDURE — 99204 OFFICE O/P NEW MOD 45 MIN: CPT | Performed by: FAMILY MEDICINE

## 2025-01-22 RX ORDER — ATORVASTATIN CALCIUM 20 MG/1
20 TABLET, FILM COATED ORAL DAILY
Qty: 90 TABLET | Refills: 1 | Status: SHIPPED | OUTPATIENT
Start: 2025-01-22

## 2025-01-22 RX ORDER — MELOXICAM 15 MG/1
15 TABLET ORAL DAILY
Qty: 90 TABLET | Refills: 1 | Status: SHIPPED | OUTPATIENT
Start: 2025-01-22

## 2025-01-22 RX ORDER — MELOXICAM 15 MG/1
15 TABLET ORAL DAILY
COMMUNITY
End: 2025-01-22 | Stop reason: SDUPTHER

## 2025-01-22 SDOH — ECONOMIC STABILITY: FOOD INSECURITY: WITHIN THE PAST 12 MONTHS, YOU WORRIED THAT YOUR FOOD WOULD RUN OUT BEFORE YOU GOT MONEY TO BUY MORE.: NEVER TRUE

## 2025-01-22 SDOH — ECONOMIC STABILITY: FOOD INSECURITY: WITHIN THE PAST 12 MONTHS, THE FOOD YOU BOUGHT JUST DIDN'T LAST AND YOU DIDN'T HAVE MONEY TO GET MORE.: NEVER TRUE

## 2025-01-22 ASSESSMENT — PATIENT HEALTH QUESTIONNAIRE - PHQ9
SUM OF ALL RESPONSES TO PHQ QUESTIONS 1-9: 0
1. LITTLE INTEREST OR PLEASURE IN DOING THINGS: NOT AT ALL
SUM OF ALL RESPONSES TO PHQ QUESTIONS 1-9: 0
2. FEELING DOWN, DEPRESSED OR HOPELESS: NOT AT ALL
SUM OF ALL RESPONSES TO PHQ9 QUESTIONS 1 & 2: 0
SUM OF ALL RESPONSES TO PHQ QUESTIONS 1-9: 0
SUM OF ALL RESPONSES TO PHQ QUESTIONS 1-9: 0

## 2025-01-22 ASSESSMENT — ENCOUNTER SYMPTOMS
BLOOD IN STOOL: 0
CONSTIPATION: 0
SHORTNESS OF BREATH: 0
DIARRHEA: 0

## 2025-01-22 NOTE — PROGRESS NOTES
Tae Hansen, a male of 49 y.o. came to the office 1/22/2025.     Patient Active Problem List   Diagnosis    Cellulitis    Hyperlipidemia    Obesity (BMI 30-39.9)    Hyperglycemia          Hyperlipidemia  This is a chronic problem. The current episode started more than 1 year ago. The problem is controlled. Pertinent negatives include no chest pain, leg pain, myalgias or shortness of breath. Current antihyperlipidemic treatment includes statins. There are no compliance problems.    Arthritis  Presents for follow-up visit. The symptoms have been stable. Affected locations include the right knee. His pain is at a severity of 4/10. Associated symptoms include rash. Pertinent negatives include no diarrhea or fatigue.   Rash  This is a recurrent problem. The current episode started more than 1 month ago (Aug). The rash is diffuse. The rash is characterized by itchiness and redness. He was exposed to nothing (thought it was due to injectible GLP-1. dog sleeps in bed hs with them.). Pertinent negatives include no diarrhea, fatigue or shortness of breath. Past treatments include oral steroids (injectible steroid.).   Anxiety  Presents for follow-up visit. Patient reports no chest pain, decreased concentration, depressed mood, insomnia, irritability, nervous/anxious behavior, palpitations or shortness of breath.            No Known Allergies    No current outpatient medications on file prior to visit.     No current facility-administered medications on file prior to visit.       Review of Systems   Constitutional:  Negative for activity change, appetite change, fatigue, irritability and unexpected weight change.   Respiratory:  Negative for shortness of breath.    Cardiovascular:  Negative for chest pain, palpitations and leg swelling.   Gastrointestinal:  Negative for blood in stool, constipation and diarrhea.   Musculoskeletal:  Positive for arthritis. Negative for myalgias.   Skin:  Positive for rash.

## 2025-03-20 ENCOUNTER — TELEPHONE (OUTPATIENT)
Dept: PRIMARY CARE CLINIC | Age: 50
End: 2025-03-20

## 2025-03-20 ENCOUNTER — OFFICE VISIT (OUTPATIENT)
Dept: SURGERY | Age: 50
End: 2025-03-20

## 2025-03-20 VITALS
DIASTOLIC BLOOD PRESSURE: 81 MMHG | HEIGHT: 72 IN | HEART RATE: 67 BPM | RESPIRATION RATE: 18 BRPM | SYSTOLIC BLOOD PRESSURE: 148 MMHG | WEIGHT: 269 LBS | BODY MASS INDEX: 36.44 KG/M2 | OXYGEN SATURATION: 97 %

## 2025-03-20 DIAGNOSIS — F41.1 GAD (GENERALIZED ANXIETY DISORDER): ICD-10-CM

## 2025-03-20 DIAGNOSIS — Z12.11 ENCOUNTER FOR SCREENING COLONOSCOPY: Primary | ICD-10-CM

## 2025-03-20 NOTE — TELEPHONE ENCOUNTER
Martin stated he takes 2 pills daily of the 20 mg Fluoxetine.    Can you send in a new RX for the 20 mg to [facility] in [place], taking two daily?

## 2025-03-20 NOTE — PROGRESS NOTES
reviewed and interpreted unless otherwise explicitly stated).  The referring provider's notes were also reviewed.    Any procedures planned or discussed as above had risks, benefits, and reasonable alternatives thoroughly discussed with the patient or responsible party.    If utilized, the patient (or guardian, if applicable) and other individuals in attendance with the patient were advised that Artificial Intelligence will be utilized during this visit to record, process the conversation to generate a clinical note, and support improvement of the AI technology. The patient (or guardian, if applicable) and other individuals in attendance at the appointment consented to the use of AI, including the recording.        NOTE: This report, in part or full, may have been transcribed using voice recognition software. Every effort was made to ensure accuracy; however, inadvertent computerized transcription errors may be present. Please excuse any transcriptional grammatical or spelling errors that may have escaped my editorial review.

## 2025-03-23 RX ORDER — FLUOXETINE HYDROCHLORIDE 40 MG/1
40 CAPSULE ORAL DAILY
Qty: 30 CAPSULE | Refills: 3 | Status: SHIPPED | OUTPATIENT
Start: 2025-03-23

## 2025-04-01 ENCOUNTER — PREP FOR PROCEDURE (OUTPATIENT)
Dept: SURGERY | Age: 50
End: 2025-04-01

## 2025-04-01 ENCOUNTER — TELEPHONE (OUTPATIENT)
Dept: SURGERY | Age: 50
End: 2025-04-01

## 2025-04-01 DIAGNOSIS — Z12.11 SCREEN FOR COLON CANCER: ICD-10-CM

## 2025-04-01 NOTE — TELEPHONE ENCOUNTER
Tae Hansen is scheduled for colonoscopy with Dr Galloway on 07-03-25 at [facility]. Patient needs to be NPO after midnight the night before procedure. All surgery instructions were explained to the patient and a surgery letter was also mailed out. MA informed patient that PAT will also be calling to review pre-op instructions and medications. Patient verbalized understanding.  Electronically signed by Karley Serrano MA on 4/1/2025 at 8:31 AM

## 2025-05-01 PROBLEM — Z12.11 SCREEN FOR COLON CANCER: Status: RESOLVED | Noted: 2025-04-01 | Resolved: 2025-05-01

## 2025-05-12 PROBLEM — Z12.11 SCREEN FOR COLON CANCER: Status: ACTIVE | Noted: 2025-04-01

## 2025-05-13 ENCOUNTER — TELEPHONE (OUTPATIENT)
Dept: SURGERY | Age: 50
End: 2025-05-13

## 2025-05-13 NOTE — TELEPHONE ENCOUNTER
Patient's wife called and rescheduled patient's colonoscopy to 10-03-25 at [facility].  Electronically signed by Karley Serrano MA on 5/13/2025 at 6:33 AM

## 2025-06-11 PROBLEM — Z12.11 SCREEN FOR COLON CANCER: Status: RESOLVED | Noted: 2025-04-01 | Resolved: 2025-06-11

## 2025-07-29 ENCOUNTER — OFFICE VISIT (OUTPATIENT)
Dept: PRIMARY CARE CLINIC | Age: 50
End: 2025-07-29
Payer: COMMERCIAL

## 2025-07-29 VITALS
RESPIRATION RATE: 18 BRPM | TEMPERATURE: 98.1 F | DIASTOLIC BLOOD PRESSURE: 84 MMHG | HEART RATE: 76 BPM | SYSTOLIC BLOOD PRESSURE: 128 MMHG | WEIGHT: 276.5 LBS | OXYGEN SATURATION: 95 % | HEIGHT: 72 IN | BODY MASS INDEX: 37.45 KG/M2

## 2025-07-29 DIAGNOSIS — F41.1 GAD (GENERALIZED ANXIETY DISORDER): ICD-10-CM

## 2025-07-29 DIAGNOSIS — E66.09 CLASS 2 OBESITY DUE TO EXCESS CALORIES WITHOUT SERIOUS COMORBIDITY WITH BODY MASS INDEX (BMI) OF 37.0 TO 37.9 IN ADULT: ICD-10-CM

## 2025-07-29 DIAGNOSIS — E66.812 CLASS 2 OBESITY DUE TO EXCESS CALORIES WITHOUT SERIOUS COMORBIDITY WITH BODY MASS INDEX (BMI) OF 37.0 TO 37.9 IN ADULT: ICD-10-CM

## 2025-07-29 DIAGNOSIS — M19.90 ARTHRITIS: ICD-10-CM

## 2025-07-29 DIAGNOSIS — E78.5 HYPERLIPIDEMIA, UNSPECIFIED HYPERLIPIDEMIA TYPE: Primary | ICD-10-CM

## 2025-07-29 LAB
ALBUMIN: 4.3 G/DL (ref 3.5–5.2)
ALP BLD-CCNC: 86 U/L (ref 40–129)
ALT SERPL-CCNC: 40 U/L (ref 0–50)
ANION GAP SERPL CALCULATED.3IONS-SCNC: 12 MMOL/L (ref 7–16)
AST SERPL-CCNC: 31 U/L (ref 0–50)
BILIRUB SERPL-MCNC: 0.4 MG/DL (ref 0–1.2)
BUN BLDV-MCNC: 13 MG/DL (ref 6–20)
CALCIUM SERPL-MCNC: 9.4 MG/DL (ref 8.6–10)
CHLORIDE BLD-SCNC: 105 MMOL/L (ref 98–107)
CHOLESTEROL, TOTAL: 191 MG/DL
CO2: 22 MMOL/L (ref 22–29)
CREAT SERPL-MCNC: 0.9 MG/DL (ref 0.7–1.2)
GFR, ESTIMATED: >90 ML/MIN/1.73M2
GLUCOSE BLD-MCNC: 99 MG/DL (ref 74–99)
HDLC SERPL-MCNC: 57 MG/DL
LDL CHOLESTEROL: 108 MG/DL
POTASSIUM SERPL-SCNC: 4.3 MMOL/L (ref 3.5–5.1)
SODIUM BLD-SCNC: 139 MMOL/L (ref 136–145)
TOTAL PROTEIN: 6.9 G/DL (ref 6.4–8.3)
TRIGL SERPL-MCNC: 131 MG/DL
VLDLC SERPL CALC-MCNC: 26 MG/DL

## 2025-07-29 PROCEDURE — 99214 OFFICE O/P EST MOD 30 MIN: CPT | Performed by: FAMILY MEDICINE

## 2025-07-29 RX ORDER — FLUOXETINE HYDROCHLORIDE 40 MG/1
40 CAPSULE ORAL DAILY
Qty: 90 CAPSULE | Refills: 1 | Status: SHIPPED | OUTPATIENT
Start: 2025-07-29

## 2025-07-29 RX ORDER — ATORVASTATIN CALCIUM 20 MG/1
20 TABLET, FILM COATED ORAL DAILY
Qty: 90 TABLET | Refills: 1 | Status: SHIPPED | OUTPATIENT
Start: 2025-07-29

## 2025-07-29 RX ORDER — MELOXICAM 15 MG/1
15 TABLET ORAL DAILY
Qty: 90 TABLET | Refills: 1 | Status: SHIPPED | OUTPATIENT
Start: 2025-07-29

## 2025-07-29 ASSESSMENT — ENCOUNTER SYMPTOMS: SHORTNESS OF BREATH: 0

## 2025-07-29 NOTE — PROGRESS NOTES
Tae Hansen, a male of 49 y.o. came to the office 7/29/2025.     Patient Active Problem List   Diagnosis    Cellulitis    Hyperlipidemia    Obesity (BMI 30-39.9)    Hyperglycemia          Hyperlipidemia  This is a chronic problem. The current episode started more than 1 year ago. The problem is controlled. Pertinent negatives include no chest pain, leg pain, myalgias or shortness of breath. Current antihyperlipidemic treatment includes statins. The current treatment provides significant improvement of lipids. There are no compliance problems.    Anxiety  Presents for follow-up (took new job at [employer]. left [employer] after yrs. he's happier. finanies tighter.) visit. Patient reports no chest pain, decreased concentration, depressed mood, irritability, nervous/anxious behavior or shortness of breath.       Joint Pain   The pain is present in the right knee. This is a chronic problem. The current episode started more than 1 year ago. There has been a history of trauma (age 18 skiing). He has tried NSAIDS for the symptoms. The treatment provided significant relief.   Weight Management  This is a chronic problem. The current episode started more than 1 year ago. The problem has been waxing and waning. Associated symptoms include arthralgias. Pertinent negatives include no chest pain, fatigue or myalgias.        No Known Allergies    No current outpatient medications on file prior to visit.     No current facility-administered medications on file prior to visit.       Review of Systems   Constitutional:  Negative for activity change, appetite change, fatigue and irritability.   Respiratory:  Negative for shortness of breath.    Cardiovascular:  Negative for chest pain.   Musculoskeletal:  Positive for arthralgias. Negative for myalgias.   Psychiatric/Behavioral:  Negative for agitation, decreased concentration, dysphoric mood and sleep disturbance. The patient is not nervous/anxious.      other review of systems

## (undated) DEVICE — GLOVE ORANGE PI 8 1/2   MSG9085

## (undated) DEVICE — GLOVE SURG SZ 6 THK91MIL LTX FREE SYN POLYISOPRENE ANTI

## (undated) DEVICE — SURGICAL PROCEDURE PACK HND

## (undated) DEVICE — SOLUTION IV IRRIG POUR BRL 0.9% SODIUM CHL 2F7124

## (undated) DEVICE — INTENDED FOR TISSUE SEPARATION, AND OTHER PROCEDURES THAT REQUIRE A SHARP SURGICAL BLADE TO PUNCTURE OR CUT.: Brand: BARD-PARKER ® STAINLESS STEEL BLADES

## (undated) DEVICE — BANDAGE,GAUZE,BULKEE II,4.5"X4.1YD,STRL: Brand: MEDLINE

## (undated) DEVICE — 4-PORT MANIFOLD: Brand: NEPTUNE 2

## (undated) DEVICE — CLOTH SURG PREP PREOPERATIVE CHLORHEXIDINE GLUC 2% READYPREP

## (undated) DEVICE — SWAB SPEC COLL SHFT L5.25IN POLYUR FOAM TIP SFT DBL MEDIA

## (undated) DEVICE — DOUBLE BASIN SET: Brand: MEDLINE INDUSTRIES, INC.

## (undated) DEVICE — GLOVE SURG SZ 65 L12IN FNGR THK79MIL GRN LTX FREE

## (undated) DEVICE — COVER HNDL LT DISP

## (undated) DEVICE — 6 X 9  1.75MIL 4-WALL LABGUARD: Brand: MINIGRIP COMMERCIAL LLC

## (undated) DEVICE — CONTAINER VACUTAINER ANAER CULTURE SWAB